# Patient Record
Sex: FEMALE | Race: WHITE | NOT HISPANIC OR LATINO | Employment: OTHER | URBAN - NONMETROPOLITAN AREA
[De-identification: names, ages, dates, MRNs, and addresses within clinical notes are randomized per-mention and may not be internally consistent; named-entity substitution may affect disease eponyms.]

---

## 2017-07-06 ENCOUNTER — HOSPITAL ENCOUNTER (OUTPATIENT)
Dept: RADIOLOGY | Facility: OTHER | Age: 57
End: 2017-07-06
Attending: NURSE PRACTITIONER

## 2017-07-06 ENCOUNTER — HISTORY (OUTPATIENT)
Dept: FAMILY MEDICINE | Facility: OTHER | Age: 57
End: 2017-07-06

## 2017-07-06 ENCOUNTER — OFFICE VISIT - GICH (OUTPATIENT)
Dept: FAMILY MEDICINE | Facility: OTHER | Age: 57
End: 2017-07-06

## 2017-07-06 DIAGNOSIS — M25.562 PAIN IN LEFT KNEE: ICD-10-CM

## 2017-12-21 ENCOUNTER — COMMUNICATION - GICH (OUTPATIENT)
Dept: FAMILY MEDICINE | Facility: OTHER | Age: 57
End: 2017-12-21

## 2017-12-21 DIAGNOSIS — F32.89 OTHER SPECIFIED DEPRESSIVE EPISODES: ICD-10-CM

## 2017-12-27 NOTE — PROGRESS NOTES
Patient Information     Patient Name MRN Sex     Josie Wilson [id number] Female 1960      Progress Notes by Lakisha De La Cruz NP at 2017  9:15 AM     Author:  Lakisha De La Cruz NP Service:  (none) Author Type:  PHYS- Nurse Practitioner     Filed:  2017  1:11 PM Encounter Date:  2017 Status:  Signed     :  Lakisha De La Cruz NP (PHYS- Nurse Practitioner)            HPI:    Josie Wilson is a 57 y.o. female who presents to clinic today for leg pain. Has had pain present for 1 week. No known injuries to leg/knee. She was working on a roof replacing shingles last week. Also going up/down ladders often. No other changes in activty or exercise. No pain with walking on flat areas. If she twists or turns her knee/leg a certain way will having shooting pain. She has used heat/ice, advil for sx. Hx of torn meniscus that did not have surgical repair and pain resolved, this was about 1 1/2 years ago. Denies any swelling. Pain seems to be to inner mid-shin area. No feeling of knee giving out on her. Had pain going up stairs until yesterday, this seems to be better. She does have pain going up ramp. No issues going down stairs or ramp.     Past Medical History:     Diagnosis  Date     ANEMIA, SEVERE      Depression     History of depression       Hx of being hospitalized age 14    for mono      MENORRHAGIA      Past Surgical History:      Procedure  Laterality Date     AMPUTATION  3/6/12     revision of left index finger, Dr Cortez       COLONOSCOPY SCREENING  2014    10 year follow up       LAP CHOLECYSTECTOMY       TOTAL ABDOMINAL HYSTERECTOMY       Social History     Substance Use Topics       Smoking status: Never Smoker     Smokeless tobacco: Not on file     Alcohol use No     Current Outpatient Prescriptions       Medication  Sig Dispense Refill     multivitamin (MVI) tablet Take 1 tablet by mouth once daily.  0     PARoxetine (PAXIL) 40 mg tablet Take 1 tablet by mouth once daily. 90  tablet 3     No current facility-administered medications for this visit.      Medications have been reviewed by me and are current to the best of my knowledge and ability.    Allergies     Allergen  Reactions     Amoxicillin Rash       ROS:  Pertinent positives and negatives are noted in HPI.    EXAM:  General appearance: well appearing female, in no acute distress  Musculoskeletal: normal gait, tender with palpation over lateral knee to lateral upper shin  Dermatological: no rashes or lesions  Psychological: normal affect, alert and pleasant  Xray: xray independently reviewed and no acute findings on xray other than some arthritis appreciated; pending radiology over-read      ASSESSMENT/PLAN:    ICD-10-CM    1. Acute pain of left knee M25.562 XR KNEE 3 VIEWS AP LAT SUNRISE LEFT   Xray shows some arthritis. Sx consistent with exacerbation of arthritis vs overuse injury. Recommend sx management and reviewed s/s that would warrant f/u. All questions were answered and she is in agreement with plan.     Patient Instructions   Xray shows some arthritis  I will call with radiology report if any other concerns  Continue with heat or ice  May use ibuprofen or tylenol for pain  Avoid kneeling, ladders or any activity that may aggravate the knee pain  If symptoms last more than 1-2 weeks please follow up

## 2017-12-28 NOTE — PATIENT INSTRUCTIONS
Patient Information     Patient Name MRN Josie Reyna [id number] Female 1960      Patient Instructions by Lakisha De La Cruz NP at 2017  9:15 AM     Author:  Lakisha De La Cruz NP Service:  (none) Author Type:  PHYS- Nurse Practitioner     Filed:  2017  9:46 AM Encounter Date:  2017 Status:  Signed     :  Lakisha De La Cruz NP (PHYS- Nurse Practitioner)            Xray shows some arthritis  I will call with radiology report if any other concerns  Continue with heat or ice  May use ibuprofen or tylenol for pain  Avoid kneeling, ladders or any activity that may aggravate the knee pain  If symptoms last more than 1-2 weeks please follow up

## 2017-12-30 NOTE — NURSING NOTE
Patient Information     Patient Name MRN Josie Reyna [id number] Female 1960      Nursing Note by Carrol Villalobos at 2017  9:15 AM     Author:  Carorl Villalobos Service:  (none) Author Type:  (none)     Filed:  2017  9:14 AM Encounter Date:  2017 Status:  Signed     :  Crarol Villalobos            Patient presents to clinic with left leg pain. Patient states that it doesn't affect gait but when she moves a certain way, a sharp shooting pain happens from the knee down the leg. X 1 week.  Carrol VillalobosLPN ....................  2017   9:10 AM

## 2018-01-27 VITALS
BODY MASS INDEX: 28.77 KG/M2 | WEIGHT: 162.4 LBS | DIASTOLIC BLOOD PRESSURE: 68 MMHG | RESPIRATION RATE: 18 BRPM | HEIGHT: 63 IN | HEART RATE: 68 BPM | SYSTOLIC BLOOD PRESSURE: 128 MMHG | TEMPERATURE: 97.7 F

## 2018-01-30 ENCOUNTER — DOCUMENTATION ONLY (OUTPATIENT)
Dept: FAMILY MEDICINE | Facility: OTHER | Age: 58
End: 2018-01-30

## 2018-01-30 PROBLEM — N92.0 MENORRHAGIA: Status: ACTIVE | Noted: 2018-01-30

## 2018-01-30 PROBLEM — D64.9 ANEMIA: Status: ACTIVE | Noted: 2018-01-30

## 2018-01-30 RX ORDER — PAROXETINE 40 MG/1
40 TABLET, FILM COATED ORAL DAILY
COMMUNITY
Start: 2017-12-22 | End: 2018-03-26

## 2018-01-30 RX ORDER — DIPHENOXYLATE HYDROCHLORIDE AND ATROPINE SULFATE 2.5; .025 MG/1; MG/1
1 TABLET ORAL DAILY
COMMUNITY
Start: 2013-09-03 | End: 2024-02-26

## 2018-02-12 NOTE — TELEPHONE ENCOUNTER
Patient Information     Patient Name MRN Josie Reyna [id number] Female 1960      Telephone Encounter by Kourtney Mcallister RN at 2017  3:20 PM     Author:  Kourtney Mcallister RN Service:  (none) Author Type:  NURS- Registered Nurse     Filed:  2017  3:38 PM Encounter Date:  2017 Status:  Signed     :  Kourtney Mcallister RN (NURS- Registered Nurse)            Patient was due for annual physical on 2017.    Depression-in adults 18 and over  SSRI    Office visit in the past 12 months or as indicated in chart.  Should have clinic visit 1-2 months after initial prescription.    Last visit with COLLINS MAURICIO was on: 2016 in [facility]  Next visit with COLLINS MAURICIO is on: No future appointment listed with this provider  Next visit with Family Practice is on: No future appointment listed in this department    Max refills 12 months from last office visit or per providers notes.    PHQ Depression Screening 2016   Date of PHQ exam (doc flow) 2016   1. Lack of interest/pleasure 1 - Several days 0 - Not at all   2. Feeling down/depressed 1 - Several days 0 - Not at all   PHQ-2 TOTAL SCORE 2 0   3. Trouble sleeping 1 - Several days 1 - Several days   4. Decreased energy 2 - More than half the days 1 - Several days   5. Appetite change 1 - Several days 0 - Not at all   6. Feelings of failure 0 - Not at all 0 - Not at all   7. Trouble concentrating 0 - Not at all 0 - Not at all   8. Activity level 0 - Not at all 1 - Several days   9. Hurting yourself 0 - Not at all 0 - Not at all   PHQ-9 TOTAL SCORE 6 3   PHQ-9 Severity Level mild none   Functional Impairment somewhat difficult -   Some recent data might be hidden       Patient is due for medication management appointment. Limited refill provided at this time. [employer] message and/or letter sent for reminder to patient. Prescription refilled per RN Medication Refill  Policy.................... Kourtney Mcallister RN ....................  12/22/2017   3:37 PM

## 2018-03-21 ENCOUNTER — TELEPHONE (OUTPATIENT)
Dept: FAMILY MEDICINE | Facility: OTHER | Age: 58
End: 2018-03-21

## 2018-03-21 NOTE — TELEPHONE ENCOUNTER
Please call patient, they are inquiring on whether they need to schedule an office visit for medication renewal, or, if Dr. Newell could just renew.

## 2018-03-22 NOTE — TELEPHONE ENCOUNTER
Spoke with patient who confirmed her last name and birth date. Informed patient that she needed to be seen for medication refill. Patient will be out of medication soon, scheduled for Monday.  Anastasia Villalba LPN 3/22/2018 8:28 AM

## 2018-03-22 NOTE — TELEPHONE ENCOUNTER
Writer will contact patient and read her message from 12/21/17 when her medication was refilled last, and inform patient that she needs to be seen. Message pasted in is from old system.     Anastasia Villalba HAYDEE 3/22/2018 8:17 AM              Dear MsJesusita Wilson:     This letter is to remind you that you are due for your annual exam with Luis Newell MD. Your last comprehensive visit was more than 12 months ago.     A LIMITED refill of         PARoxetine (PAXIL) 40 mg tablet     has been called into your pharmacy. Additional refills require you to complete this appointment.     Please call the clinic at [phone] to schedule your appointment.     If you should require additional refills before your scheduled appointment, please contact your pharmacy and we will refill your medication until the date of your appointment.     If you are no longer seeing Luis Newell MD for primary care, please call to let us know. Doing so will remove you from our call/contact list.       Thank you for choosing [facility] for your health care needs.     Sincerely,     Refill RN   [facility]

## 2018-03-26 ENCOUNTER — OFFICE VISIT (OUTPATIENT)
Dept: FAMILY MEDICINE | Facility: OTHER | Age: 58
End: 2018-03-26
Attending: FAMILY MEDICINE
Payer: COMMERCIAL

## 2018-03-26 ENCOUNTER — HEALTH MAINTENANCE LETTER (OUTPATIENT)
Age: 58
End: 2018-03-26

## 2018-03-26 VITALS
WEIGHT: 165.2 LBS | HEIGHT: 63 IN | DIASTOLIC BLOOD PRESSURE: 78 MMHG | BODY MASS INDEX: 29.27 KG/M2 | SYSTOLIC BLOOD PRESSURE: 112 MMHG

## 2018-03-26 DIAGNOSIS — F33.42 RECURRENT MAJOR DEPRESSIVE DISORDER, IN FULL REMISSION (H): Primary | ICD-10-CM

## 2018-03-26 DIAGNOSIS — Z23 NEED FOR DIPHTHERIA-TETANUS-PERTUSSIS (TDAP) VACCINE, ADULT/ADOLESCENT: ICD-10-CM

## 2018-03-26 DIAGNOSIS — D50.0 IRON DEFICIENCY ANEMIA DUE TO CHRONIC BLOOD LOSS: ICD-10-CM

## 2018-03-26 DIAGNOSIS — E78.5 HYPERLIPIDEMIA LDL GOAL <100: ICD-10-CM

## 2018-03-26 PROBLEM — N92.0 MENORRHAGIA: Status: RESOLVED | Noted: 2018-01-30 | Resolved: 2018-03-26

## 2018-03-26 LAB
CHOLEST SERPL-MCNC: 224 MG/DL
ERYTHROCYTE [DISTWIDTH] IN BLOOD BY AUTOMATED COUNT: 12.4 % (ref 10–15)
HCT VFR BLD AUTO: 42.2 % (ref 35–47)
HDLC SERPL-MCNC: 48 MG/DL (ref 23–92)
HGB BLD-MCNC: 14.2 G/DL (ref 11.7–15.7)
LDLC SERPL CALC-MCNC: 146 MG/DL
MCH RBC QN AUTO: 29.2 PG (ref 26.5–33)
MCHC RBC AUTO-ENTMCNC: 33.6 G/DL (ref 31.5–36.5)
MCV RBC AUTO: 87 FL (ref 78–100)
NONHDLC SERPL-MCNC: 176 MG/DL
PLATELET # BLD AUTO: 317 10E9/L (ref 150–450)
RBC # BLD AUTO: 4.87 10E12/L (ref 3.8–5.2)
TRIGL SERPL-MCNC: 148 MG/DL
WBC # BLD AUTO: 7.4 10E9/L (ref 4–11)

## 2018-03-26 PROCEDURE — G0463 HOSPITAL OUTPT CLINIC VISIT: HCPCS

## 2018-03-26 PROCEDURE — 90471 IMMUNIZATION ADMIN: CPT

## 2018-03-26 PROCEDURE — 99396 PREV VISIT EST AGE 40-64: CPT | Performed by: FAMILY MEDICINE

## 2018-03-26 PROCEDURE — 90715 TDAP VACCINE 7 YRS/> IM: CPT | Performed by: FAMILY MEDICINE

## 2018-03-26 PROCEDURE — 36415 COLL VENOUS BLD VENIPUNCTURE: CPT | Performed by: FAMILY MEDICINE

## 2018-03-26 PROCEDURE — G0463 HOSPITAL OUTPT CLINIC VISIT: HCPCS | Mod: 25

## 2018-03-26 PROCEDURE — 85027 COMPLETE CBC AUTOMATED: CPT | Performed by: FAMILY MEDICINE

## 2018-03-26 PROCEDURE — 80061 LIPID PANEL: CPT | Performed by: FAMILY MEDICINE

## 2018-03-26 RX ORDER — PAROXETINE 40 MG/1
40 TABLET, FILM COATED ORAL DAILY
Qty: 90 TABLET | Refills: 3 | Status: SHIPPED | OUTPATIENT
Start: 2018-03-26 | End: 2019-04-04

## 2018-03-26 ASSESSMENT — PAIN SCALES - GENERAL: PAINLEVEL: NO PAIN (0)

## 2018-03-26 NOTE — LETTER
March 26, 2018      Josie Wilson  [address] [address] [address]        Dear ,    We are writing to inform you of your test results.    Your hgb and wbc look good but as expected your lipids are higher than we like; I suggest reducing your red meat consumption.  COLLINS MAURICIO MD on 3/26/2018 at 3:11 PM     Resulted Orders   Lipid Profile   Result Value Ref Range    Cholesterol 224 (H) <200 mg/dL    Triglycerides 148 <150 mg/dL    HDL Cholesterol 48 23 - 92 mg/dL    LDL Cholesterol Calculated 146 (H) <100 mg/dL      Comment:      Above desirable:  100-129 mg/dl  Borderline High:  130-159 mg/dL  High:             160-189 mg/dL  Very high:       >189 mg/dl      Non HDL Cholesterol 176 (H) <130 mg/dL      Comment:      Above Desirable:  130-159 mg/dl  Borderline high:  160-189 mg/dl  High:             190-219 mg/dl  Very high:       >219 mg/dl     CBC with platelets   Result Value Ref Range    WBC 7.4 4.0 - 11.0 10e9/L    RBC Count 4.87 3.8 - 5.2 10e12/L    Hemoglobin 14.2 11.7 - 15.7 g/dL    Hematocrit 42.2 35.0 - 47.0 %    MCV 87 78 - 100 fl    MCH 29.2 26.5 - 33.0 pg    MCHC 33.6 31.5 - 36.5 g/dL    RDW 12.4 10.0 - 15.0 %    Platelet Count 317 150 - 450 10e9/L       If you have any questions or concerns, please call the clinic at the number listed above.       Sincerely,        COLLINS MAURICIO MD

## 2018-03-26 NOTE — PROGRESS NOTES
"  SUBJECTIVE:   Josie Wilson is a 57 year old female who presents to clinic today for the following health issues:  Comes in for health care maintenance anmd has been doing well; she has had depressive sx which are much better on the paroxetine. Her lipids have been mildly up so this can be checked and she was anemic prior to hysterectomy so will recheck this. Had hysterectomy 10 + years ago. Is due for DT so will give today. Shingrex was offered but declined.            Problem list and histories reviewed & adjusted, as indicated.  Additional history: as documented        Reviewed and updated as needed this visit by clinical staff  Tobacco       Reviewed and updated as needed this visit by Provider         ROS:  CONSTITUTIONAL: NEGATIVE for fever, chills, change in weight  INTEGUMENTARY/SKIN: NEGATIVE for worrisome rashes, moles or lesions  EYES: NEGATIVE for vision changes or irritation  ENT/MOUTH: NEGATIVE for ear, mouth and throat problems  RESP: NEGATIVE for significant cough or SOB  BREAST: NEGATIVE for masses, tenderness or discharge  CV: NEGATIVE for chest pain, palpitations or peripheral edema  GI: NEGATIVE for nausea, abdominal pain, heartburn, or change in bowel habits  : NEGATIVE for frequency, dysuria, or hematuria  MUSCULOSKELETAL: NEGATIVE for significant arthralgias or myalgia  NEURO: NEGATIVE for weakness, dizziness or paresthesias  ENDOCRINE: NEGATIVE for temperature intolerance, skin/hair changes  HEME: NEGATIVE for bleeding problems  PSYCHIATRIC: NEGATIVE for changes in mood or affect    OBJECTIVE:     /78 (BP Location: Right arm, Patient Position: Sitting, Cuff Size: Adult Regular)  Ht 5' 3\" (1.6 m)  Wt 165 lb 3.2 oz (74.9 kg)  Breastfeeding? No  BMI 29.26 kg/m2  Body mass index is 29.26 kg/(m^2).  GENERAL: healthy, alert and no distress  NECK: no adenopathy, no asymmetry, masses, or scars and thyroid normal to palpation  RESP: lungs clear to auscultation - no rales, rhonchi or " wheezes  CV: regular rate and rhythm, normal S1 S2, no S3 or S4, no murmur, click or rub, no peripheral edema and peripheral pulses strong  ABDOMEN: soft, nontender, no hepatosplenomegaly, no masses and bowel sounds normal  MS: no gross musculoskeletal defects noted, no edema        ASSESSMENT/PLAN:           1. Recurrent major depressive disorder, in full remission (H)    - PARoxetine (PAXIL) 40 MG tablet; Take 1 tablet (40 mg) by mouth daily  Dispense: 90 tablet; Refill: 3    2. Iron deficiency anemia due to chronic blood loss    - CBC with platelets    3. Hyperlipidemia LDL goal <100    - Lipid Profile    4. Need for diphtheria-tetanus-pertussis (Tdap) vaccine, adult/adolescent    - TDAP VACCINE (BOOSTRIX )    See Patient Instructions    COLLINS MAURICIO MD  [facility]

## 2018-03-26 NOTE — MR AVS SNAPSHOT
After Visit Summary   3/26/2018    Josie Wilson    MRN: [id number]           Patient Information     Date Of Birth          1960        Visit Information        Provider Department      3/26/2018 8:15 AM Luis Newell MD [facility]        Today's Diagnoses     Recurrent major depressive disorder, in full remission (H)    -  1    Iron deficiency anemia due to chronic blood loss        Hyperlipidemia LDL goal <100        Need for diphtheria-tetanus-pertussis (Tdap) vaccine, adult/adolescent           Follow-ups after your visit        Your next 10 appointments already scheduled     Mar 29, 2018 10:00 AM CDT   (Arrive by 9:45 AM)   MA SCREENING DIGITAL BILATERAL with GHMA1   [facility] ([facility])    [address]  [place] [address] [phone]           Do not use any powder, lotion or deodorant under your arms or on your breast. If you do, we will ask you to remove it before your exam.  Wear comfortable, two-piece clothing.  If you have any allergies, tell your care team.  Bring any previous mammograms from other facilities or have them mailed to the breast center.              Future tests that were ordered for you today     Open Future Orders        Priority Expected Expires Ordered    *MA Screening Digital Bilateral Routine  3/26/2019 3/26/2018            Who to contact     If you have questions or need follow up information about today's clinic visit or your schedule please contact [facility] directly at [phone].  Normal or non-critical lab and imaging results will be communicated to you by MyChart, letter or phone within 4 business days after the clinic has received the results. If you do not hear from us within 7 days, please contact the clinic through MyChart or phone. If you have a critical or abnormal lab result, we will notify you by phone as soon as possible.  Submit  "refill requests through [employer] or call your pharmacy and they will forward the refill request to us. Please allow 3 business days for your refill to be completed.          Additional Information About Your Visit        [employer] Information     [employer] gives you secure access to your electronic health record. If you see a primary care provider, you can also send messages to your care team and make appointments. If you have questions, please call your primary care clinic.  If you do not have a primary care provider, please call [phone] and they will assist you.        Care EveryWhere ID     This is your Care EveryWhere ID. This could be used by other organizations to access your [facility] medical records  [id number]        Your Vitals Were     Height Breastfeeding? BMI (Body Mass Index)             5' 3\" (1.6 m) No 29.26 kg/m2          Blood Pressure from Last 3 Encounters:   03/26/18 112/78   07/06/17 128/68   12/12/16 118/74    Weight from Last 3 Encounters:   03/26/18 165 lb 3.2 oz (74.9 kg)   07/06/17 162 lb 6.4 oz (73.7 kg)   12/12/16 165 lb (74.8 kg)              We Performed the Following     CBC with platelets     Lipid Profile     TDAP VACCINE (BOOSTRIX )          Where to get your medicines      These medications were sent to [facility] [id number] [facility] - [place], [place] - [address]  [address] [facility] [address]     Phone:  [phone]     PARoxetine 40 MG tablet          Primary Care Provider Office Phone # Fax #    Luis Newell [phone]       [address] [facility] [address]        Equal Access to Services     [facility] ELSY : Hadii sadie Flaherty, clementina lundberg, endy kapoor. So [facility] [phone].    ATENCIÓN: Si habla español, tiene a trejo disposición servicios gratuitos de asistencia lingüística. Llame al [phone].    We comply with applicable federal civil rights laws and " [place] laws. We do not discriminate on the basis of race, color, national origin, age, disability, sex, sexual orientation, or gender identity.            Thank you!     Thank you for choosing [facility]  for your care. Our goal is always to provide you with excellent care. Hearing back from our patients is one way we can continue to improve our services. Please take a few minutes to complete the written survey that you may receive in the mail after your visit with us. Thank you!             Your Updated Medication List - Protect others around you: Learn how to safely use, store and throw away your medicines at www.disposemymeds.org.          This list is accurate as of 3/26/18 11:59 PM.  Always use your most recent med list.                   Brand Name Dispense Instructions for use Diagnosis    MULTI-VITAMINS Tabs      Take 1 tablet by mouth daily        PARoxetine 40 MG tablet    PAXIL    90 tablet    Take 1 tablet (40 mg) by mouth daily    Recurrent major depressive disorder, in full remission (H)

## 2018-03-27 ASSESSMENT — PATIENT HEALTH QUESTIONNAIRE - PHQ9: SUM OF ALL RESPONSES TO PHQ QUESTIONS 1-9: 2

## 2018-04-06 ENCOUNTER — HOSPITAL ENCOUNTER (OUTPATIENT)
Dept: MAMMOGRAPHY | Facility: OTHER | Age: 58
Discharge: HOME | End: 2018-04-06
Attending: FAMILY MEDICINE | Admitting: FAMILY MEDICINE
Payer: COMMERCIAL

## 2018-04-06 DIAGNOSIS — Z12.39 SCREENING BREAST EXAMINATION: ICD-10-CM

## 2018-04-06 PROCEDURE — 77067 SCR MAMMO BI INCL CAD: CPT

## 2018-04-12 ENCOUNTER — HOSPITAL ENCOUNTER (OUTPATIENT)
Dept: MAMMOGRAPHY | Facility: OTHER | Age: 58
End: 2018-04-12
Attending: FAMILY MEDICINE
Payer: COMMERCIAL

## 2018-04-12 ENCOUNTER — HOSPITAL ENCOUNTER (OUTPATIENT)
Dept: ULTRASOUND IMAGING | Facility: OTHER | Age: 58
Discharge: HOME | End: 2018-04-12
Attending: FAMILY MEDICINE | Admitting: FAMILY MEDICINE
Payer: COMMERCIAL

## 2018-04-12 DIAGNOSIS — R92.8 ABNORMAL FINDING ON BREAST IMAGING: ICD-10-CM

## 2018-04-12 PROCEDURE — 77065 DX MAMMO INCL CAD UNI: CPT | Mod: LT

## 2018-04-12 PROCEDURE — 76642 ULTRASOUND BREAST LIMITED: CPT | Mod: LT

## 2018-07-24 NOTE — PROGRESS NOTES
Patient Information     Patient Name  Josie Wilson MRN  [id number] Sex  Female   1960      Letter by Luis Newell MD at      Author:  Luis Newell MD Service:  (none) Author Type:  (none)    Filed:   Encounter Date:  2017 Status:  (Other)           Josie Wilson  [address]  [facility] [address]          2017    Dear Ms. Wilson:    This letter is to remind you that you are due for your annual exam with Luis Newell MD. Your last comprehensive visit was more than 12 months ago.    A LIMITED refill of         PARoxetine (PAXIL) 40 mg tablet     has been called into your pharmacy. Additional refills require you to complete this appointment.    Please call the clinic at [phone] to schedule your appointment.    If you should require additional refills before your scheduled appointment, please contact your pharmacy and we will refill your medication until the date of your appointment.    If you are no longer seeing Luis Newell MD for primary care, please call to let us know. Doing so will remove you from our call/contact list.      Thank you for choosing [facility] and [facility] for your health care needs.    Sincerely,    Refill RN  [facility]

## 2019-04-04 DIAGNOSIS — F33.42 RECURRENT MAJOR DEPRESSIVE DISORDER, IN FULL REMISSION (H): ICD-10-CM

## 2019-04-04 RX ORDER — PAROXETINE 40 MG/1
40 TABLET, FILM COATED ORAL DAILY
Qty: 90 TABLET | Refills: 0 | Status: SHIPPED | OUTPATIENT
Start: 2019-04-04 | End: 2019-07-12

## 2019-07-12 ENCOUNTER — OFFICE VISIT (OUTPATIENT)
Dept: FAMILY MEDICINE | Facility: OTHER | Age: 59
End: 2019-07-12
Attending: NURSE PRACTITIONER
Payer: COMMERCIAL

## 2019-07-12 VITALS
HEIGHT: 63 IN | HEART RATE: 72 BPM | DIASTOLIC BLOOD PRESSURE: 80 MMHG | RESPIRATION RATE: 12 BRPM | SYSTOLIC BLOOD PRESSURE: 118 MMHG | WEIGHT: 161 LBS | TEMPERATURE: 98 F | BODY MASS INDEX: 28.53 KG/M2

## 2019-07-12 DIAGNOSIS — F33.42 RECURRENT MAJOR DEPRESSIVE DISORDER, IN FULL REMISSION (H): ICD-10-CM

## 2019-07-12 PROCEDURE — 99214 OFFICE O/P EST MOD 30 MIN: CPT | Performed by: NURSE PRACTITIONER

## 2019-07-12 PROCEDURE — G0463 HOSPITAL OUTPT CLINIC VISIT: HCPCS

## 2019-07-12 RX ORDER — PAROXETINE 40 MG/1
40 TABLET, FILM COATED ORAL DAILY
Qty: 90 TABLET | Refills: 3 | Status: SHIPPED | OUTPATIENT
Start: 2019-07-12 | End: 2019-12-31

## 2019-07-12 ASSESSMENT — PAIN SCALES - GENERAL: PAINLEVEL: NO PAIN (1)

## 2019-07-12 ASSESSMENT — MIFFLIN-ST. JEOR: SCORE: 1256.48

## 2019-07-12 ASSESSMENT — PATIENT HEALTH QUESTIONNAIRE - PHQ9: SUM OF ALL RESPONSES TO PHQ QUESTIONS 1-9: 3

## 2019-07-12 NOTE — PROGRESS NOTES
HPI:    Josie Wilson is a 59 year old female who presents to clinic today for med management.  She has been on fluoxetine 40 mg for 8 to 10 years to manage her major depression.  She reports that this is working very well for her.  She does not have any side effects of the medication.  She does have difficulty sleeping at night and takes melatonin 10 mg which is working well for her.  She denies any concerns regarding suicidal ideation or self-harm.  She is due for her physical and will get this scheduled.    Past Medical History:   Diagnosis Date     Anemia     No Comments Provided     Excessive and frequent menstruation with regular cycle     No Comments Provided     Major depressive disorder, single episode     History of depression     Personal history of other medical treatment (CODE)     age 14,for mono       Past Surgical History:   Procedure Laterality Date     COLONOSCOPY      2014,10 year follow up     HYSTERECTOMY TOTAL ABDOMINAL           LAPAROSCOPIC CHOLECYSTECTOMY           OTHER SURGICAL HISTORY      3/6/12,[id number],AMPUTATION,revision of left index finger, Dr Cortez       Family History   Problem Relation Age of Onset     Substance Abuse Mother         Alcohol/Drug,Problems with alcohol abuse     Heart Disease Father         Heart Disease,CAD, MI, did smoke     Diabetes Father         Diabetes     Hypertension Father         Hypertension     Hypertension Sister         Hypertension     Hypertension Sister         Hypertension     Breast Cancer Maternal Aunt         Cancer-breast     Cancer Maternal Aunt         Cancer, of pharyngeal cancer age 68     Other - See Comments Other         Psychiatric illness,Depression, probably committed suicide     Heart Disease Other         Heart Disease,MI     Other - See Comments Other         smoked     Diabetes Other         Diabetes     Heart Disease Other         Heart Disease,CAD     Heart Disease Other         Heart Disease,CAD      Cancer Other         Cancer,Testicular cancer.       Social History     Socioeconomic History     Marital status:      Spouse name: Not on file     Number of children: Not on file     Years of education: Not on file     Highest education level: Not on file   Occupational History     Not on file   Social Needs     Financial resource strain: Not on file     Food insecurity:     Worry: Not on file     Inability: Not on file     Transportation needs:     Medical: Not on file     Non-medical: Not on file   Tobacco Use     Smoking status: Never Smoker     Smokeless tobacco: Never Used   Substance and Sexual Activity     Alcohol use: No     Drug use: Never     Comment: Drug use: No     Sexual activity: Not Currently   Lifestyle     Physical activity:     Days per week: Not on file     Minutes per session: Not on file     Stress: Not on file   Relationships     Social connections:     Talks on phone: Not on file     Gets together: Not on file     Attends [employer] service: Not on file     Active member of club or organization: Not on file     Attends meetings of clubs or organizations: Not on file     Relationship status: Not on file     Intimate partner violence:     Fear of current or ex partner: Not on file     Emotionally abused: Not on file     Physically abused: Not on file     Forced sexual activity: Not on file   Other Topics Concern     Not on file   Social History Narrative    Lives with her .  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  One of their sons works with them and another one works part time when he is home from college.  Another son is working construction in [place] and 2 daughters still at home, one in college at [employer].    p 9/3/2013.       Current Outpatient Medications   Medication Sig Dispense Refill     PARoxetine (PAXIL) 40 MG tablet Take 1 tablet (40 mg) by mouth daily 90 tablet 3     Multiple Vitamin (MULTI-VITAMINS) TABS Take 1 tablet by mouth daily   "       Allergies   Allergen Reactions     Amoxicillin Rash       ROS:  Pertinent positives and negatives are noted in HPI.    EXAM:  /80 (BP Location: Right arm, Patient Position: Sitting, Cuff Size: Adult Regular)   Pulse 72   Temp 98  F (36.7  C) (Tympanic)   Resp 12   Ht 1.588 m (5' 2.5\")   Wt 73 kg (161 lb)   BMI 28.98 kg/m    General appearance: well appearing female, in no acute distress  Respiratory: clear to auscultation bilaterally  Cardiac: RRR with no murmurs  She is currently tolerating the proximal a teen psychological: normal affect, alert and pleasant    PHQ Depression Screen  PHQ-9 SCORE 12/12/2016 3/26/2018 7/12/2019   PHQ-9 Total Score 3 2 3     No flowsheet data found.        ASSESSMENT AND PLAN:    1. Recurrent major depressive disorder, in full remission (H)      She is currently tolerating the paroxetine very well.  This is managing her depression symptoms without side effects.  Refill was completed for 1 year.  She will follow-up at her convenience to schedule her annual physical.    Lakisha De La Cruz..................7/12/2019 9:03 AM      This document was prepared using voice generated software.  While every attempt was made for accuracy, grammatical errors may exist.  "

## 2019-07-12 NOTE — LETTER
My Depression Action Plan  Name: Josie Wilson   Date of Birth 1960  Date: 7/12/2019    My doctor: No Ref-Primary, Physician   My clinic: [facility]  [address]  [place] [id number] [phone]          GREEN    ZONE   Good Control    What it looks like:     Things are going generally well. You have normal up s and down s. You may even feel depressed from time to time, but bad moods usually last less than a day.   What you need to do:  1. Continue to care for yourself (see self care plan)  2. Check your depression survival kit and update it as needed  3. Follow your physician s recommendations including any medication.  4. Do not stop taking medication unless you consult with your physician first.           YELLOW         ZONE Getting Worse    What it looks like:     Depression is starting to interfere with your life.     It may be hard to get out of bed; you may be starting to isolate yourself from others.    Symptoms of depression are starting to last most all day and this has happened for several days.     You may have suicidal thoughts but they are not constant.   What you need to do:     1. Call your care team, your response to treatment will improve if you keep your care team informed of your progress. Yellow periods are signs an adjustment may need to be made.     2. Continue your self-care, even if you have to fake it!    3. Talk to someone in your support network    4. Open up your depression survival kit           RED    ZONE Medical Alert - Get Help    What it looks like:     Depression is seriously interfering with your life.     You may experience these or other symptoms: You can t get out of bed most days, can t work or engage in other necessary activities, you have trouble taking care of basic hygiene, or basic responsibilities, thoughts of suicide or death that will not go away, self-injurious behavior.     What you need to do:  1. Call  your care team and request a same-day appointment. If they are not available (weekends or after hours) call your local crisis line, emergency room or 911.            Depression Self Care Plan / Survival Kit    Self-Care for Depression  Here s the deal. Your body and mind are really not as separate as most people think.  What you do and think affects how you feel and how you feel influences what you do and think. This means if you do things that people who feel good do, it will help you feel better.  Sometimes this is all it takes.  There is also a place for medication and therapy depending on how severe your depression is, so be sure to consult with your medical provider and/ or Behavioral Health Consultant if your symptoms are worsening or not improving.     In order to better manage my stress, I will:    Exercise  Get some form of exercise, every day. This will help reduce pain and release endorphins, the  feel good  chemicals in your brain. This is almost as good as taking antidepressants!  This is not the same as joining a gym and then never going! (they count on that by the way ) It can be as simple as just going for a walk or doing some gardening, anything that will get you moving.      Hygiene   Maintain good hygiene (Get out of bed in the morning, Make your bed, Brush your teeth, Take a shower, and Get dressed like you were going to work, even if you are unemployed).  If your clothes don't fit try to get ones that do.    Diet  I will strive to eat foods that are good for me, drink plenty of water, and avoid excessive sugar, caffeine, alcohol, and other mood-altering substances.  Some foods that are helpful in depression are: complex carbohydrates, B vitamins, flaxseed, fish or fish oil, fresh fruits and vegetables.    Psychotherapy  I agree to participate in Individual Therapy (if recommended).    Medication  If prescribed medications, I agree to take them.  Missing doses can result in serious side effects.   I understand that drinking alcohol, or other illicit drug use, may cause potential side effects.  I will not stop my medication abruptly without first discussing it with my provider.    Staying Connected With Others  I will stay in touch with my friends, family members, and my primary care provider/team.    Use your imagination  Be creative.  We all have a creative side; it doesn t matter if it s oil painting, sand castles, or mud pies! This will also kick up the endorphins.    Witness Beauty  (AKA stop and smell the roses) Take a look outside, even in mid-winter. Notice colors, textures. Watch the squirrels and birds.     Service to others  Be of service to others.  There is always someone else in need.  By helping others we can  get out of ourselves  and remember the really important things.  This also provides opportunities for practicing all the other parts of the program.    Humor  Laugh and be silly!  Adjust your TV habits for less news and crime-drama and more comedy.    Control your stress  Try breathing deep, massage therapy, biofeedback, and meditation. Find time to relax each day.     My support system    Clinic Contact:  Phone number:    Contact 1:  Phone number:    Contact 2:  Phone number:    [employer]ist/:  Phone number:    Therapist:  Phone number:    [facility] crisis center:    Phone number:    Other community support:  Phone number:

## 2019-07-12 NOTE — NURSING NOTE
Patient here for medication refills.  Liberty Wu............................... 7/12/2019 8:59 AM    Medication Reconciliation: complete    Liberty Moreno LPN

## 2019-12-31 ENCOUNTER — HOSPITAL ENCOUNTER (OUTPATIENT)
Dept: GENERAL RADIOLOGY | Facility: OTHER | Age: 59
Discharge: HOME | End: 2019-12-31
Attending: NURSE PRACTITIONER | Admitting: NURSE PRACTITIONER
Payer: COMMERCIAL

## 2019-12-31 ENCOUNTER — OFFICE VISIT (OUTPATIENT)
Dept: FAMILY MEDICINE | Facility: OTHER | Age: 59
End: 2019-12-31
Attending: NURSE PRACTITIONER
Payer: COMMERCIAL

## 2019-12-31 ENCOUNTER — HOSPITAL ENCOUNTER (OUTPATIENT)
Dept: GENERAL RADIOLOGY | Facility: OTHER | Age: 59
End: 2019-12-31
Attending: NURSE PRACTITIONER
Payer: COMMERCIAL

## 2019-12-31 VITALS
RESPIRATION RATE: 16 BRPM | WEIGHT: 160.38 LBS | OXYGEN SATURATION: 98 % | SYSTOLIC BLOOD PRESSURE: 118 MMHG | BODY MASS INDEX: 28.42 KG/M2 | DIASTOLIC BLOOD PRESSURE: 72 MMHG | TEMPERATURE: 97.8 F | HEIGHT: 63 IN | HEART RATE: 77 BPM

## 2019-12-31 DIAGNOSIS — M79.642 PAIN OF LEFT HAND: ICD-10-CM

## 2019-12-31 DIAGNOSIS — Z13.1 SCREENING FOR DIABETES MELLITUS: ICD-10-CM

## 2019-12-31 DIAGNOSIS — M25.562 ACUTE PAIN OF LEFT KNEE: ICD-10-CM

## 2019-12-31 DIAGNOSIS — Z13.220 LIPID SCREENING: ICD-10-CM

## 2019-12-31 DIAGNOSIS — F33.42 RECURRENT MAJOR DEPRESSIVE DISORDER, IN FULL REMISSION (H): ICD-10-CM

## 2019-12-31 DIAGNOSIS — D50.9 IRON DEFICIENCY ANEMIA, UNSPECIFIED IRON DEFICIENCY ANEMIA TYPE: ICD-10-CM

## 2019-12-31 DIAGNOSIS — Z23 NEED FOR PROPHYLACTIC VACCINATION AND INOCULATION AGAINST INFLUENZA: ICD-10-CM

## 2019-12-31 DIAGNOSIS — Z00.00 ROUTINE HISTORY AND PHYSICAL EXAMINATION OF ADULT: Primary | ICD-10-CM

## 2019-12-31 LAB
ALBUMIN SERPL-MCNC: 4.5 G/DL (ref 3.5–5.7)
ALP SERPL-CCNC: 68 U/L (ref 34–104)
ALT SERPL W P-5'-P-CCNC: 15 U/L (ref 7–52)
ANION GAP SERPL CALCULATED.3IONS-SCNC: 7 MMOL/L (ref 3–14)
AST SERPL W P-5'-P-CCNC: 16 U/L (ref 13–39)
BASOPHILS # BLD AUTO: 0.1 10E9/L (ref 0–0.2)
BASOPHILS NFR BLD AUTO: 0.7 %
BILIRUB SERPL-MCNC: 0.7 MG/DL (ref 0.3–1)
BUN SERPL-MCNC: 16 MG/DL (ref 7–25)
CALCIUM SERPL-MCNC: 9.5 MG/DL (ref 8.6–10.3)
CHLORIDE SERPL-SCNC: 103 MMOL/L (ref 98–107)
CHOLEST SERPL-MCNC: 205 MG/DL
CO2 SERPL-SCNC: 29 MMOL/L (ref 21–31)
CREAT SERPL-MCNC: 0.78 MG/DL (ref 0.6–1.2)
DIFFERENTIAL METHOD BLD: NORMAL
EOSINOPHIL # BLD AUTO: 0.2 10E9/L (ref 0–0.7)
EOSINOPHIL NFR BLD AUTO: 2 %
ERYTHROCYTE [DISTWIDTH] IN BLOOD BY AUTOMATED COUNT: 12.2 % (ref 10–15)
GFR SERPL CREATININE-BSD FRML MDRD: 75 ML/MIN/{1.73_M2}
GLUCOSE SERPL-MCNC: 103 MG/DL (ref 70–105)
HCT VFR BLD AUTO: 41.5 % (ref 35–47)
HDLC SERPL-MCNC: 48 MG/DL (ref 23–92)
HGB BLD-MCNC: 13.4 G/DL (ref 11.7–15.7)
IMM GRANULOCYTES # BLD: 0 10E9/L (ref 0–0.4)
IMM GRANULOCYTES NFR BLD: 0.1 %
LDLC SERPL CALC-MCNC: 135 MG/DL
LYMPHOCYTES # BLD AUTO: 2.4 10E9/L (ref 0.8–5.3)
LYMPHOCYTES NFR BLD AUTO: 30.2 %
MCH RBC QN AUTO: 28.5 PG (ref 26.5–33)
MCHC RBC AUTO-ENTMCNC: 32.3 G/DL (ref 31.5–36.5)
MCV RBC AUTO: 88 FL (ref 78–100)
MONOCYTES # BLD AUTO: 0.7 10E9/L (ref 0–1.3)
MONOCYTES NFR BLD AUTO: 8.4 %
NEUTROPHILS # BLD AUTO: 4.7 10E9/L (ref 1.6–8.3)
NEUTROPHILS NFR BLD AUTO: 58.6 %
NONHDLC SERPL-MCNC: 157 MG/DL
PLATELET # BLD AUTO: 321 10E9/L (ref 150–450)
POTASSIUM SERPL-SCNC: 3.9 MMOL/L (ref 3.5–5.1)
PROT SERPL-MCNC: 7.2 G/DL (ref 6.4–8.9)
RBC # BLD AUTO: 4.71 10E12/L (ref 3.8–5.2)
SODIUM SERPL-SCNC: 139 MMOL/L (ref 134–144)
TRIGL SERPL-MCNC: 108 MG/DL
WBC # BLD AUTO: 8 10E9/L (ref 4–11)

## 2019-12-31 PROCEDURE — 73130 X-RAY EXAM OF HAND: CPT | Mod: LT

## 2019-12-31 PROCEDURE — 85025 COMPLETE CBC W/AUTO DIFF WBC: CPT | Mod: ZL | Performed by: NURSE PRACTITIONER

## 2019-12-31 PROCEDURE — 73560 X-RAY EXAM OF KNEE 1 OR 2: CPT | Mod: LT

## 2019-12-31 PROCEDURE — 36415 COLL VENOUS BLD VENIPUNCTURE: CPT | Mod: ZL | Performed by: NURSE PRACTITIONER

## 2019-12-31 PROCEDURE — 80053 COMPREHEN METABOLIC PANEL: CPT | Mod: ZL | Performed by: NURSE PRACTITIONER

## 2019-12-31 PROCEDURE — G0463 HOSPITAL OUTPT CLINIC VISIT: HCPCS

## 2019-12-31 PROCEDURE — 80061 LIPID PANEL: CPT | Mod: ZL | Performed by: NURSE PRACTITIONER

## 2019-12-31 PROCEDURE — G0463 HOSPITAL OUTPT CLINIC VISIT: HCPCS | Mod: 25

## 2019-12-31 PROCEDURE — 90686 IIV4 VACC NO PRSV 0.5 ML IM: CPT

## 2019-12-31 PROCEDURE — 90471 IMMUNIZATION ADMIN: CPT | Performed by: NURSE PRACTITIONER

## 2019-12-31 PROCEDURE — 90471 IMMUNIZATION ADMIN: CPT

## 2019-12-31 PROCEDURE — 99396 PREV VISIT EST AGE 40-64: CPT | Performed by: NURSE PRACTITIONER

## 2019-12-31 RX ORDER — PAROXETINE 40 MG/1
40 TABLET, FILM COATED ORAL DAILY
Qty: 90 TABLET | Refills: 3 | Status: SHIPPED | OUTPATIENT
Start: 2019-12-31 | End: 2020-10-30

## 2019-12-31 ASSESSMENT — PATIENT HEALTH QUESTIONNAIRE - PHQ9: SUM OF ALL RESPONSES TO PHQ QUESTIONS 1-9: 0

## 2019-12-31 ASSESSMENT — PAIN SCALES - GENERAL: PAINLEVEL: MILD PAIN (3)

## 2019-12-31 ASSESSMENT — MIFFLIN-ST. JEOR: SCORE: 1253.65

## 2019-12-31 NOTE — PROGRESS NOTES
Nursing Notes:   Roxann Gomez LPN  12/31/2019  8:12 AM  Signed  Patient presents to clinic today for a physical. She states she has concerns about bilateral knee pain and left middle hand joint pain.     No LMP recorded. Patient has had a hysterectomy.  Medication Reconciliation: complete    Roxann Gomez LPN  12/31/2019 8:08 AM      ANNUAL PHYSICAL - FEMALE    HPI: Josie Wilson is a 59 year old female who presents for a yearly exam.      Concerns include: Left hand swelling and left knee pain    She has had swelling to her left third distal metacarpal for a long time.  He does not have significant pain but reports this is achy.  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    Left knee pain that seems to be aggravated over the past 2 weeks.  This seems to be worse at night when she rests.  She has not had any injuries that she is aware of.  No changes in her gait.  She has not taken anything such as ibuprofen or Tylenol, no heat or ice.  Nothing makes this better nothing makes this worse.    No LMP recorded. Patient has had a hysterectomy.   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), 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 03/06/2012     Priority: Medium       Past Medical History:   Diagnosis Date     Anemia     No Comments Provided     Excessive and frequent menstruation with regular cycle     No Comments Provided      Major depressive disorder, single episode     History of depression     Personal history of other medical treatment (CODE)     age 14,for mono       Past Surgical History:   Procedure Laterality Date     COLONOSCOPY  2014,10 year follow up     HYSTERECTOMY TOTAL ABDOMINAL           LAPAROSCOPIC CHOLECYSTECTOMY           OTHER SURGICAL HISTORY      3/6/12,[id number],AMPUTATION,revision of left index finger, Dr Cortez       Family History   Problem Relation Age of Onset     Substance Abuse Mother         Alcohol/Drug,Problems with alcohol abuse     Heart Disease Father         Heart Disease,CAD, MI, did smoke     Diabetes Father         Diabetes     Hypertension Father         Hypertension     Hypertension Sister         Hypertension     Substance Abuse Sister      Bronchitis Sister      Heart Disease Sister      Hypertension Sister         Hypertension     Other - See Comments Other         Psychiatric illness,Depression, probably committed suicide     Breast Cancer Maternal Aunt         Cancer-breast     Cancer Maternal Aunt         Cancer, of pharyngeal cancer age 68     Heart Disease Other         Heart Disease,MI     Other - See Comments Other         smoked     Diabetes Other         Diabetes     Heart Disease Other         Heart Disease,CAD     Heart Disease Other         Heart Disease,CAD     Cancer Other         Cancer,Testicular cancer.     Hypertension Brother      Hypertension Brother      Hypertension Brother        Social History     Socioeconomic History     Marital status:      Spouse name: Not on file     Number of children: Not on file     Years of education: Not on file     Highest education level: Not on file   Occupational History     Not on file   Social Needs     Financial resource strain: Not on file     Food insecurity:     Worry: Not on file     Inability: Not on file     Transportation needs:     Medical: Not on file     Non-medical: Not on file  "  Tobacco Use     Smoking status: Never Smoker     Smokeless tobacco: Never Used   Substance and Sexual Activity     Alcohol use: No     Drug use: Never     Comment: Drug use: No     Sexual activity: Not Currently   Lifestyle     Physical activity:     Days per week: Not on file     Minutes per session: Not on file     Stress: Not on file   Relationships     Social connections:     Talks on phone: Not on file     Gets together: Not on file     Attends Church service: Not on file     Active member of club or organization: Not on file     Attends meetings of clubs or organizations: Not on file     Relationship status: Not on file     Intimate partner violence:     Fear of current or ex partner: Not on file     Emotionally abused: Not on file     Physically abused: Not on file     Forced sexual activity: Not on file   Other Topics Concern     Not on file   Social History Narrative    Lives with her .  ,  is disabled. 3 children, adults       Current Outpatient Medications   Medication Sig Dispense Refill     Multiple Vitamin (MULTI-VITAMINS) TABS Take 1 tablet by mouth daily       PARoxetine (PAXIL) 40 MG tablet Take 1 tablet (40 mg) by mouth daily 90 tablet 3       Allergies   Allergen Reactions     Amoxicillin Rash       REVIEW OF SYSTEMS:  Complete review of systems was obtained.  All pertinent positives and negatives as noted above.  She is exercising about 15 minutes 5 times a week.  Has had vision exam  No recent dental exam.    PHYSICAL EXAM:  /72 (BP Location: Right arm, Patient Position: Sitting, Cuff Size: Adult Regular)   Pulse 77   Temp 97.8  F (36.6  C) (Tympanic)   Resp 16   Ht 1.588 m (5' 2.5\")   Wt 72.7 kg (160 lb 6 oz)   SpO2 98%   Breastfeeding No   BMI 28.87 kg/m    CONSTITUTIONAL:  Alert,cooperative, NAD.  EYES: No scleral icterus.  PERRLA.  Conjunctiva clear.  ENT/MOUTH: External ears and nose normal.  TMs normal.  Moist mucous membranes. Oropharynx clear.  "   ENDO: No thyromegaly or thyroidnodules.  LYMPH:  No cervical or supraclavicular LA.    BREASTS: No skin abnormalities, no erythema.  No discrete masses.  No nipple discharge, no axillary, supra- or infraclavicular LA.   CARDIOVASCULAR: Regular,S1, S2.  No S3 or S4.  No murmur/gallop/rub.  No peripheral edema.  RESPIRATORY: CTA bilaterally, no wheezes, rhonchi or rales.  GI: Bowel sounds wnl.  Soft, nontender, nondistended.  No masses or HSM.  No rebound orguarding.  MSKEL: Swelling over left third distal metacarpal, left distal index finger status post amputation.  No pain to swelling.  No effusion noted to left knee.  Positive crepitus noted.  INTEGUMENTARY:  Warm, dry.  No rash noted on exposed skin.  NEUROLOGIC: Facies symmetric.  Grossly normal movement and tone.  No tremor.  PSYCHIATRIC: Affect normal.  Speech fluent.      PHQ Depression Screen  PHQ-9 SCORE 3/26/2018 7/12/2019 12/31/2019   PHQ-9 Total Score 2 3 0     Xray: xray independently reviewed and degenerative changes in the left knee and left hand appreciated; pending radiology over-read    Labs:  Results for orders placed or performed in visit on 12/31/19   CBC and Differential     Status: None   Result Value Ref Range    WBC 8.0 4.0 - 11.0 10e9/L    RBC Count 4.71 3.8 - 5.2 10e12/L    Hemoglobin 13.4 11.7 - 15.7 g/dL    Hematocrit 41.5 35.0 - 47.0 %    MCV 88 78 - 100 fl    MCH 28.5 26.5 - 33.0 pg    MCHC 32.3 31.5 - 36.5 g/dL    RDW 12.2 10.0 - 15.0 %    Platelet Count 321 150 - 450 10e9/L    Diff Method Automated Method     % Neutrophils 58.6 %    % Lymphocytes 30.2 %    % Monocytes 8.4 %    % Eosinophils 2.0 %    % Basophils 0.7 %    % Immature Granulocytes 0.1 %    Absolute Neutrophil 4.7 1.6 - 8.3 10e9/L    Absolute Lymphocytes 2.4 0.8 - 5.3 10e9/L    Absolute Monocytes 0.7 0.0 - 1.3 10e9/L    Absolute Eosinophils 0.2 0.0 - 0.7 10e9/L    Absolute Basophils 0.1 0.0 - 0.2 10e9/L    Abs Immature Granulocytes 0.0 0 - 0.4 10e9/L   Comprehensive  Metabolic Panel     Status: None   Result Value Ref Range    Sodium 139 134 - 144 mmol/L    Potassium 3.9 3.5 - 5.1 mmol/L    Chloride 103 98 - 107 mmol/L    Carbon Dioxide 29 21 - 31 mmol/L    Anion Gap 7 3 - 14 mmol/L    Glucose 103 70 - 105 mg/dL    Urea Nitrogen 16 7 - 25 mg/dL    Creatinine 0.78 0.60 - 1.20 mg/dL    GFR Estimate 75 >60 mL/min/[1.73_m2]    GFR Estimate If Black >90 >60 mL/min/[1.73_m2]    Calcium 9.5 8.6 - 10.3 mg/dL    Bilirubin Total 0.7 0.3 - 1.0 mg/dL    Albumin 4.5 3.5 - 5.7 g/dL    Protein Total 7.2 6.4 - 8.9 g/dL    Alkaline Phosphatase 68 34 - 104 U/L    ALT 15 7 - 52 U/L    AST 16 13 - 39 U/L   Lipid Panel     Status: Abnormal   Result Value Ref Range    Cholesterol 205 (H) <200 mg/dL    Triglycerides 108 <150 mg/dL    HDL Cholesterol 48 23 - 92 mg/dL    LDL Cholesterol Calculated 135 (H) <100 mg/dL    Non HDL Cholesterol 157 (H) <130 mg/dL       ASSESSMENT AND PLAN:    1. Routine history and physical examination of adult    2. Recurrent major depressive disorder, in full remission (H)    3. Lipid screening    4. Acute pain of left knee    5. Pain of left hand    6. Need for prophylactic vaccination and inoculation against influenza      Influenza vaccine was updated  Paroxetine was refilled x1 year  Recommend 150 minutes of exercise weekly  Labs obtained as noted above.  The 10-year ASCVD risk score (Rogerio WENDI Jr., et al., 2013) is: 3.5%    Values used to calculate the score:      Age: 59 years      Sex: Female      Is Non- : No      Diabetic: No      Tobacco smoker: No      Systolic Blood Pressure: 118 mmHg      Is BP treated: No      HDL Cholesterol: 48 mg/dL      Total Cholesterol: 205 mg/dL    X-rays are consistent with osteoarthritis changes.  Recommend acetaminophen as needed.  If symptoms persist will recommend orthopedic follow-up.  She is going to her insurance changes and will schedule her mammogram once her insurance is sorted out.    Relevant cancer  screening discussed.    Counseled on healthy diet, Calcium and vitamin D intake, and exercise.    ALVERTO Haines CNP ....................  12/31/2019   8:12 AM       This document was prepared using voice generated software.  While every attempt was made for accuracy, grammatical errors may exist.

## 2019-12-31 NOTE — NURSING NOTE
Patient presents to clinic today for a physical. She states she has concerns about bilateral knee pain and left middle hand joint pain.     No LMP recorded. Patient has had a hysterectomy.  Medication Reconciliation: complete    Roxann Gomez LPN  12/31/2019 8:08 AM

## 2019-12-31 NOTE — PATIENT INSTRUCTIONS
"Healthy Strategies  1. Eat at least 3 meals a day and never skip breakfast.  2. Eat more slowly.  3. Decrease portion size.  4. Provide structure by using meal replacement bars or shakes, and/or low calorie frozen meals.  5. For good nutrition incorporate fruit, vegetables, whole grains, lean protein, and low-fat dairy.  6. Remove trigger foods from yourenvironment to avoid impulse eating.  7. Increase physical activity: get a pedometer and aim for 10,000 steps a day or 30-35 minutes of activity 5 days per week.  8. Weigh yourself daily or at least weekly.  9. Keep a record of what you eat and your activity.  10. Establish a support system such as afriend, group or program.    11. Read Martin Nair's \"Eat to Live\". Remember it is important to have a minimum of 1200 calories a day, okay to use olive oil, 40 grams of fiber daily. No more than two servings (the size of your palm) of red meat a week.     Please consider the following general health recommendations:    Schedule a mammogram annually starting at the age of 40 years old unless recommended earlier by your primary care provider. Come for a general exam once yearly.    Eat a quality diet (generally, low in simple sugars, starches, cholesterol and saturated fat.)    Please get 1500 mg of calcium in divided doses with 1500 units vitamin D in your diet daily. Take supplements as needed to obtain full recommended amounts.     Stay physically active. Regular walking or other exercise is one of the best ways to minimize pain of arthritis; maintain independence and mobility; maintain bone strength; maintain conditioning of your heart. Find something you enjoy and a friend to do it with you.    Maintain ideal weight. Your Body mass index is Body mass index is 28.87 kg/m .. Generally a BMI of 20-25 is considered ideal. Overweight is defined as 25-30, obese is 30-35 and markedly obese is greater than 35.    Apply sun block (SPF 25 or greater) on exposed skin anytime you " are out in the sun to prevent skin cancer.     Wear a seatbelt whenever you are in a car.    Consider a bone density study every 2-5 years to see if you are at increased risk for fracture. If you have osteoporosis, medicine to strengthen your bones can significantly reduce your risk of fracture, back pain, and loss of height.    Colonoscopy (an exam of the colon) is recommended every 10 years after the age of 50 to screen for colon cancer. (More often if you are at increased risk.)    Obtain a flu shot every fall.    Receive a pneumonia shot series after the age of 65 (repeat in 5 years if you have other risk factors). This does not prevent all types of pneumonia, but reduces the risk of the worst bacterial causes of pneumonia.    You should have a tetanus booster at least once every 10 years.    A vaccine to reduce your chances of getting shingles is available if you are over 50. Information about the vaccine is available through the clinic or at:  (https://www.cdc.gov/vaccines/vpd/shingles/public/shingrix/index.html)    Check blood sugar annually. Cholesterol annually unless you have had a normal level when last checked within 5 years.     I recommend that you have a general physical exam every year. You should have a pap test every 3 years between the ages of 21 and 30 and every 3-5 years between the ages of 30 and 65 depending on your test unless you have had previous abnormal pap smears, (in these cases the exams and PAP's should be done on a schedule as recommended by your primary care provider). If you have had hysterectomy in the past, your future Pap plan may be different.

## 2019-12-31 NOTE — LETTER
December 31, 2019      Josie Wilson  [id number] [address] [id number]        Dear ,    We are writing to inform you of your test results.    Test results indicate you may require additional follow up, see comment below.  Your cholesterol levels are mildly elevated although they are improved compared to last year.  I would recommend working on daily exercise approximately 30 minutes a day as well as a heart healthy diet such as the Mediterranean diet.  Plan to follow-up in 1 year for retest.    Resulted Orders   CBC and Differential   Result Value Ref Range    WBC 8.0 4.0 - 11.0 10e9/L    RBC Count 4.71 3.8 - 5.2 10e12/L    Hemoglobin 13.4 11.7 - 15.7 g/dL    Hematocrit 41.5 35.0 - 47.0 %    MCV 88 78 - 100 fl    MCH 28.5 26.5 - 33.0 pg    MCHC 32.3 31.5 - 36.5 g/dL    RDW 12.2 10.0 - 15.0 %    Platelet Count 321 150 - 450 10e9/L    Diff Method Automated Method     % Neutrophils 58.6 %    % Lymphocytes 30.2 %    % Monocytes 8.4 %    % Eosinophils 2.0 %    % Basophils 0.7 %    % Immature Granulocytes 0.1 %    Absolute Neutrophil 4.7 1.6 - 8.3 10e9/L    Absolute Lymphocytes 2.4 0.8 - 5.3 10e9/L    Absolute Monocytes 0.7 0.0 - 1.3 10e9/L    Absolute Eosinophils 0.2 0.0 - 0.7 10e9/L    Absolute Basophils 0.1 0.0 - 0.2 10e9/L    Abs Immature Granulocytes 0.0 0 - 0.4 10e9/L   Comprehensive Metabolic Panel   Result Value Ref Range    Sodium 139 134 - 144 mmol/L    Potassium 3.9 3.5 - 5.1 mmol/L    Chloride 103 98 - 107 mmol/L    Carbon Dioxide 29 21 - 31 mmol/L    Anion Gap 7 3 - 14 mmol/L    Glucose 103 70 - 105 mg/dL    Urea Nitrogen 16 7 - 25 mg/dL    Creatinine 0.78 0.60 - 1.20 mg/dL    GFR Estimate 75 >60 mL/min/[1.73_m2]    GFR Estimate If Black >90 >60 mL/min/[1.73_m2]    Calcium 9.5 8.6 - 10.3 mg/dL    Bilirubin Total 0.7 0.3 - 1.0 mg/dL    Albumin 4.5 3.5 - 5.7 g/dL    Protein Total 7.2 6.4 - 8.9 g/dL    Alkaline Phosphatase 68 34 - 104 U/L    ALT 15 7 - 52 U/L    AST 16 13 - 39 U/L    Lipid Panel   Result Value Ref Range    Cholesterol 205 (H) <200 mg/dL    Triglycerides 108 <150 mg/dL    HDL Cholesterol 48 23 - 92 mg/dL    LDL Cholesterol Calculated 135 (H) <100 mg/dL      Comment:      Above desirable:  100-129 mg/dl  Borderline High:  130-159 mg/dL  High:             160-189 mg/dL  Very high:       >189 mg/dl      Non HDL Cholesterol 157 (H) <130 mg/dL      Comment:      Above Desirable:  130-159 mg/dl  Borderline high:  160-189 mg/dl  High:             190-219 mg/dl  Very high:       >219 mg/dl         If you have any questions or concerns, please call the clinic at the number listed above.       Sincerely,        ALVERTO Haines CNP

## 2020-06-25 ENCOUNTER — NURSE TRIAGE (OUTPATIENT)
Dept: FAMILY MEDICINE | Facility: OTHER | Age: 60
End: 2020-06-25

## 2020-06-25 ENCOUNTER — OFFICE VISIT (OUTPATIENT)
Dept: INTERNAL MEDICINE | Facility: OTHER | Age: 60
End: 2020-06-25
Attending: NURSE PRACTITIONER
Payer: COMMERCIAL

## 2020-06-25 VITALS
WEIGHT: 164.8 LBS | OXYGEN SATURATION: 97 % | SYSTOLIC BLOOD PRESSURE: 128 MMHG | DIASTOLIC BLOOD PRESSURE: 80 MMHG | HEIGHT: 62 IN | TEMPERATURE: 98.4 F | BODY MASS INDEX: 30.33 KG/M2 | RESPIRATION RATE: 16 BRPM | HEART RATE: 74 BPM

## 2020-06-25 DIAGNOSIS — R00.2 PALPITATIONS: ICD-10-CM

## 2020-06-25 DIAGNOSIS — I49.3 PVC'S (PREMATURE VENTRICULAR CONTRACTIONS): ICD-10-CM

## 2020-06-25 DIAGNOSIS — R07.9 CHEST PAIN, UNSPECIFIED TYPE: Primary | ICD-10-CM

## 2020-06-25 DIAGNOSIS — Z82.49 FAMILY HISTORY OF ISCHEMIC HEART DISEASE: ICD-10-CM

## 2020-06-25 DIAGNOSIS — R06.02 SOB (SHORTNESS OF BREATH): ICD-10-CM

## 2020-06-25 DIAGNOSIS — D50.9 IRON DEFICIENCY ANEMIA, UNSPECIFIED IRON DEFICIENCY ANEMIA TYPE: ICD-10-CM

## 2020-06-25 DIAGNOSIS — E78.2 MIXED HYPERLIPIDEMIA: ICD-10-CM

## 2020-06-25 LAB
ALBUMIN SERPL-MCNC: 4.4 G/DL (ref 3.5–5.7)
ALP SERPL-CCNC: 69 U/L (ref 34–104)
ALT SERPL W P-5'-P-CCNC: 18 U/L (ref 7–52)
ANION GAP SERPL CALCULATED.3IONS-SCNC: 7 MMOL/L (ref 3–14)
AST SERPL W P-5'-P-CCNC: 18 U/L (ref 13–39)
BASOPHILS # BLD AUTO: 0.1 10E9/L (ref 0–0.2)
BASOPHILS NFR BLD AUTO: 0.7 %
BILIRUB SERPL-MCNC: 0.6 MG/DL (ref 0.3–1)
BUN SERPL-MCNC: 17 MG/DL (ref 7–25)
CALCIUM SERPL-MCNC: 9.5 MG/DL (ref 8.6–10.3)
CHLORIDE SERPL-SCNC: 101 MMOL/L (ref 98–107)
CHOLEST SERPL-MCNC: 215 MG/DL
CO2 SERPL-SCNC: 30 MMOL/L (ref 21–31)
CREAT SERPL-MCNC: 0.89 MG/DL (ref 0.6–1.2)
DIFFERENTIAL METHOD BLD: NORMAL
EOSINOPHIL # BLD AUTO: 0.2 10E9/L (ref 0–0.7)
EOSINOPHIL NFR BLD AUTO: 2.4 %
ERYTHROCYTE [DISTWIDTH] IN BLOOD BY AUTOMATED COUNT: 12.1 % (ref 10–15)
GFR SERPL CREATININE-BSD FRML MDRD: 64 ML/MIN/{1.73_M2}
GLUCOSE SERPL-MCNC: 98 MG/DL (ref 70–105)
HCT VFR BLD AUTO: 41.9 % (ref 35–47)
HDLC SERPL-MCNC: 42 MG/DL (ref 23–92)
HGB BLD-MCNC: 13.6 G/DL (ref 11.7–15.7)
IMM GRANULOCYTES # BLD: 0 10E9/L (ref 0–0.4)
IMM GRANULOCYTES NFR BLD: 0.3 %
LDLC SERPL CALC-MCNC: 138 MG/DL
LYMPHOCYTES # BLD AUTO: 2.7 10E9/L (ref 0.8–5.3)
LYMPHOCYTES NFR BLD AUTO: 30.7 %
MCH RBC QN AUTO: 28.5 PG (ref 26.5–33)
MCHC RBC AUTO-ENTMCNC: 32.5 G/DL (ref 31.5–36.5)
MCV RBC AUTO: 88 FL (ref 78–100)
MONOCYTES # BLD AUTO: 0.7 10E9/L (ref 0–1.3)
MONOCYTES NFR BLD AUTO: 7.6 %
NEUTROPHILS # BLD AUTO: 5.2 10E9/L (ref 1.6–8.3)
NEUTROPHILS NFR BLD AUTO: 58.3 %
NONHDLC SERPL-MCNC: 173 MG/DL
PLATELET # BLD AUTO: 339 10E9/L (ref 150–450)
POTASSIUM SERPL-SCNC: 3.9 MMOL/L (ref 3.5–5.1)
PROT SERPL-MCNC: 7.2 G/DL (ref 6.4–8.9)
RBC # BLD AUTO: 4.78 10E12/L (ref 3.8–5.2)
SODIUM SERPL-SCNC: 138 MMOL/L (ref 134–144)
TRIGL SERPL-MCNC: 174 MG/DL
TROPONIN I SERPL-MCNC: 2.8 PG/ML
TSH SERPL DL<=0.05 MIU/L-ACNC: 1.76 IU/ML (ref 0.34–5.6)
WBC # BLD AUTO: 8.9 10E9/L (ref 4–11)

## 2020-06-25 PROCEDURE — 99214 OFFICE O/P EST MOD 30 MIN: CPT | Performed by: NURSE PRACTITIONER

## 2020-06-25 PROCEDURE — 84443 ASSAY THYROID STIM HORMONE: CPT | Mod: ZL | Performed by: NURSE PRACTITIONER

## 2020-06-25 PROCEDURE — 85025 COMPLETE CBC W/AUTO DIFF WBC: CPT | Mod: ZL | Performed by: NURSE PRACTITIONER

## 2020-06-25 PROCEDURE — 80061 LIPID PANEL: CPT | Mod: ZL | Performed by: NURSE PRACTITIONER

## 2020-06-25 PROCEDURE — 80053 COMPREHEN METABOLIC PANEL: CPT | Mod: ZL | Performed by: NURSE PRACTITIONER

## 2020-06-25 PROCEDURE — 36415 COLL VENOUS BLD VENIPUNCTURE: CPT | Mod: ZL | Performed by: NURSE PRACTITIONER

## 2020-06-25 PROCEDURE — 93000 ELECTROCARDIOGRAM COMPLETE: CPT | Performed by: INTERNAL MEDICINE

## 2020-06-25 PROCEDURE — 84484 ASSAY OF TROPONIN QUANT: CPT | Mod: ZL | Performed by: NURSE PRACTITIONER

## 2020-06-25 RX ORDER — PREDNISONE 20 MG/1
20 TABLET ORAL 2 TIMES DAILY
Qty: 3 TABLET | Refills: 0 | Status: CANCELLED | OUTPATIENT
Start: 2020-06-25 | End: 2020-06-27

## 2020-06-25 RX ORDER — LORATADINE 10 MG/1
10 TABLET ORAL DAILY
COMMUNITY
End: 2022-05-19

## 2020-06-25 RX ORDER — SODIUM PHOSPHATE,MONO-DIBASIC 19G-7G/118
1 ENEMA (ML) RECTAL DAILY
COMMUNITY
End: 2020-12-17

## 2020-06-25 RX ORDER — ATORVASTATIN CALCIUM 10 MG/1
10 TABLET, FILM COATED ORAL DAILY
Qty: 90 TABLET | Refills: 0 | Status: SHIPPED | OUTPATIENT
Start: 2020-06-25 | End: 2020-09-16

## 2020-06-25 RX ORDER — DIPHENHYDRAMINE HCL 25 MG
25 TABLET ORAL EVERY 8 HOURS PRN
Qty: 1 TABLET | Refills: 0 | Status: CANCELLED | COMMUNITY
Start: 2020-06-25

## 2020-06-25 ASSESSMENT — ENCOUNTER SYMPTOMS
FEVER: 0
DIAPHORESIS: 0
DIZZINESS: 0
PALPITATIONS: 1
FATIGUE: 0
LIGHT-HEADEDNESS: 0

## 2020-06-25 ASSESSMENT — PAIN SCALES - GENERAL: PAINLEVEL: NO PAIN (0)

## 2020-06-25 ASSESSMENT — MIFFLIN-ST. JEOR: SCORE: 1264.75

## 2020-06-25 NOTE — NURSING NOTE
Chief Complaint   Patient presents with     Shortness of Breath   Patient presents to the clinic today for shortness of breath. Patient states that she had an episode last night where she was short of breath and had to sit down, episode lasted about 5 to 10 minutes and took about 20 minutes to regain breath.    Medication Reconciliation: completed   Winnie Sandra LPN  6/25/2020 1:41 PM

## 2020-06-25 NOTE — PROGRESS NOTES
"Nursing Notes:   Ambrocio Sandraie, LPN  2020  2:12 PM  Signed  Chief Complaint   Patient presents with     Shortness of Breath   Patient presents to the clinic today for shortness of breath. Patient states that she had an episode last night where she was short of breath and had to sit down, episode lasted about 5 to 10 minutes and took about 20 minutes to regain breath.    Medication Reconciliation: completed   Winnie MartínharrisHAYDEE  2020 1:41 PM      Nursing note reviewed with patient.  Accuracy and completeness verified.    SUBJECTIVE:                                                      Josie Wilson is a 60 year old year old female patient who presents to the clinic today for chest pain, shortness of breath with exertion.    Per patient, yesterday at 7:30 PM, she experienced an episode of chest (center) tightness, like \"someone was squeezing her\" and SOB which made her stop and sit down. This tightness was relieved within 20 minutes, and she was able to catch breath after 20 minutes. She had been outside removing nails from boards, bending over, walking short distance. She also reports she had 2 other mild similar episodes in the past 2 months. She reports more SOB with exertion lately.  Denies: abnormal sweating, injury to chest, radiating pain, dizziness, palpitations, cough, N&V, fever.  She does have family history of cardiac arrest in her father who  at age 53, his brother  6 months later of cardiac arrest, She reports her brother had congestive heart failure.  She has a personal history of anemia and hyperlipidemia.  She is not on statin therapy or aspirin therapy. She does also report some palpitations.  She reports it is like she has to take an extra breath.  Problem List/PMH: Reviewed in EMR, and made relevant updates today.  Medications: Reviewed in EMR, and made relevant updates today.  Allergies: Reviewed in EMR, and made relevant updates today.    Current Code Status:  No " "Order    REVIEW OF SYSTEMS:    Review of Systems   Constitutional: Negative for diaphoresis, fatigue and fever.   Cardiovascular: Positive for chest pain and palpitations.   Neurological: Negative for dizziness and light-headedness.   All other systems reviewed and are negative.    OBJECTIVE:                                                      EXAM:     /80 (BP Location: Right arm, Patient Position: Sitting, Cuff Size: Adult Regular)   Pulse 74   Temp 98.4  F (36.9  C) (Tympanic)   Resp 16   Ht 1.581 m (5' 2.25\")   Wt 74.8 kg (164 lb 12.8 oz)   SpO2 97%   BMI 29.90 kg/m      Current Pain Score: No Pain (0)     Physical Exam  Vitals signs reviewed.   Constitutional:       Appearance: Normal appearance.   HENT:      Head: Normocephalic and atraumatic.      Nose: Nose normal.      Mouth/Throat:      Mouth: Mucous membranes are moist.      Pharynx: Oropharynx is clear.   Eyes:      Extraocular Movements: Extraocular movements intact.      Conjunctiva/sclera: Conjunctivae normal.      Pupils: Pupils are equal, round, and reactive to light.   Neck:      Musculoskeletal: Normal range of motion and neck supple.   Cardiovascular:      Rate and Rhythm: Normal rate. Rhythm irregular.      Comments: Irregular beat  Abdominal:      General: Bowel sounds are normal.      Palpations: Abdomen is soft.   Musculoskeletal: Normal range of motion.   Skin:     General: Skin is warm and dry.   Neurological:      Mental Status: She is alert and oriented to person, place, and time. Mental status is at baseline.   Psychiatric:         Mood and Affect: Mood normal.         Behavior: Behavior normal.         Thought Content: Thought content normal.         Judgment: Judgment normal.     Diagnostics Completed at this Visit:  Results for orders placed or performed in visit on 06/25/20   TSH Reflex GH     Status: None   Result Value Ref Range    TSH Reflex 1.76 0.34 - 5.60 IU/mL   CBC with platelets differential     Status: None "   Result Value Ref Range    WBC 8.9 4.0 - 11.0 10e9/L    RBC Count 4.78 3.8 - 5.2 10e12/L    Hemoglobin 13.6 11.7 - 15.7 g/dL    Hematocrit 41.9 35.0 - 47.0 %    MCV 88 78 - 100 fl    MCH 28.5 26.5 - 33.0 pg    MCHC 32.5 31.5 - 36.5 g/dL    RDW 12.1 10.0 - 15.0 %    Platelet Count 339 150 - 450 10e9/L    Diff Method Automated Method     % Neutrophils 58.3 %    % Lymphocytes 30.7 %    % Monocytes 7.6 %    % Eosinophils 2.4 %    % Basophils 0.7 %    % Immature Granulocytes 0.3 %    Absolute Neutrophil 5.2 1.6 - 8.3 10e9/L    Absolute Lymphocytes 2.7 0.8 - 5.3 10e9/L    Absolute Monocytes 0.7 0.0 - 1.3 10e9/L    Absolute Eosinophils 0.2 0.0 - 0.7 10e9/L    Absolute Basophils 0.1 0.0 - 0.2 10e9/L    Abs Immature Granulocytes 0.0 0 - 0.4 10e9/L   Lipid Profile     Status: Abnormal   Result Value Ref Range    Cholesterol 215 (H) <200 mg/dL    Triglycerides 174 (H) <150 mg/dL    HDL Cholesterol 42 23 - 92 mg/dL    LDL Cholesterol Calculated 138 (H) <100 mg/dL    Non HDL Cholesterol 173 (H) <130 mg/dL   Comprehensive metabolic panel     Status: None   Result Value Ref Range    Sodium 138 134 - 144 mmol/L    Potassium 3.9 3.5 - 5.1 mmol/L    Chloride 101 98 - 107 mmol/L    Carbon Dioxide 30 21 - 31 mmol/L    Anion Gap 7 3 - 14 mmol/L    Glucose 98 70 - 105 mg/dL    Urea Nitrogen 17 7 - 25 mg/dL    Creatinine 0.89 0.60 - 1.20 mg/dL    GFR Estimate 64 >60 mL/min/[1.73_m2]    GFR Estimate If Black 78 >60 mL/min/[1.73_m2]    Calcium 9.5 8.6 - 10.3 mg/dL    Bilirubin Total 0.6 0.3 - 1.0 mg/dL    Albumin 4.4 3.5 - 5.7 g/dL    Protein Total 7.2 6.4 - 8.9 g/dL    Alkaline Phosphatase 69 34 - 104 U/L    ALT 18 7 - 52 U/L    AST 18 13 - 39 U/L   Troponin GH     Status: None   Result Value Ref Range    Troponin 2.8 <34.0 pg/mL   EKG 12-lead, tracing only     Status: None (Preliminary result)   Result Value Ref Range    Interpretation ECG Click View Image link to view waveform and result         ASSESSMENT/PLAN:                                                       Chest pain, unspecified type  - Troponin GH, negative at 2.8 2.8  - EKG 12-lead, tracing only. Indicated sinus rhythm with premature supraventricular complexes  - Comprehensive metabolic panel, unremarkable  - CBC with platelets differential, unremarkable    SOB (shortness of breath)  - CBC with platelets differential, unremarkable  - Troponin GH, negative at 2.8    Family history of ischemic heart disease  Discussed that she should probably make some dietary changes and implement an exercise program  - Troponin GH  - EKG 12-lead, tracing only. Indicated sinus rhythm with premature supraventricular complexes  - Lipid Profile, cholesterol elevated at 215, triglycerides elevated at 174 HDL cholesterol is 42, LDL elevated at 138 and non-HDL elevated at 173    Iron deficiency anemia, unspecified iron deficiency anemia type  - Comprehensive metabolic panel, unremarkable  - CBC with platelets differential, unremarkable    Mixed hyperlipidemia  We will start prophylactic aspirin and statin today.  - Lipid Profile, cholesterol elevated at 215, triglycerides elevated at 174 HDL cholesterol is 42, LDL elevated at 138 and non-HDL elevated at 173  - aspirin (ASA) 81 MG EC tablet  Dispense: 90 tablet; Refill: 3  - atorvastatin (LIPITOR) 10 MG tablet  Dispense: 90 tablet; Refill: 0  -She should eat a heart healthy diet and exercise for at least 30 minutes 3 times a week.  -Also discussed strength training to prevent bone loss.    Palpitations  - TSH Reflex GH, within normal range    Patient verbalizes understanding and is agreeable to plan of care.    We reviewed her screenings that are overdue.  Strongly encouraged to make appointment for full physical and to have her updated screenings completed.      ALVERTO Rosales, AGNP-C  Internal Medicine  [facility]    06/27/2020 10:10 AM    Portions of this note were dictated using speech recognition software. The note has been  proofread but errors in the text may have been overlooked. Please contact me if there are any concerns regarding the accuracy of the dictation.

## 2020-06-25 NOTE — TELEPHONE ENCOUNTER
You  Juan Lujan PA 7 hours ago (9:08 AM)       TATE Martinez. Pt triaged for episode of chest tightness and SOB last night. Pt currently asymptomatic. Pt scheduled to see you today at 1:40 PM. Thank you, Kourtney Mcallister RN .............. 6/25/2020  9:08 AM      Pt seen today in office by Jenny Flor. Writer will close encounter at this time. Kourtney Mcallister RN .............. 6/25/2020  4:09 PM

## 2020-06-25 NOTE — TELEPHONE ENCOUNTER
S-(situation): Pt c/o episode of chest tightness and SOB last night. Asymptomatic at this time.    B-(background): Overweight, Hyperlipidemia, Family hx: Dad  from MI at 53 and siblings have HTN    A-(assessment): Yesterday at 7:30 PM, Pt experienced episode of chest (center) tightness and SOB; tightness relieved w/in 5-10 minutes, able to catch breath after 20 minutes. Had been outside removing nails from boards, bending over, walking short distance. Isolated incident last night w/ exception of 2 mild episodes in the past 2 mos. More SOB w/ exertion lately. Denies: abnormal sweating, injury to chest, radiating pain, dizziness, palpitations, cough, N&V, fever    R-(recommendations): Protocol recommends Pt be seen today in office to R/O unstable angina and for evaluation of SOB w/ exertion. Pt scheduled appointment with Juan Lujan today at 1:40 PM. FYI to Juan for review.    Kourtney Mcallister RN .............. 2020  9:07 AM      ______________________________________________________        Patient called stating she had chest tightness and SOB last night and wondering what she should do. Pt transferred to nurse triage call line and she verified her last name and . Pt triaged:    Additional Information    Negative: Severe difficulty breathing (e.g., struggling for each breath, speaks in single words)    Negative: Passed out (i.e., fainted, collapsed and was not responding)    Negative: Visible sweat on face or sweat dripping down face    Negative: Sounds like a life-threatening emergency to the triager    Negative: Followed an injury to chest    Negative: SEVERE chest pain    Negative: Pain also present in shoulder(s) or arm(s) or jaw    Negative: Cocaine use within last 3 days    Negative: History of prior 'blood clot' in leg or lungs (i.e., deep vein thrombosis, pulmonary embolism)    Negative: Recent illness requiring prolonged bed rest (i.e., immobilization)    Negative: Hip or leg fracture in past  "2 months (e.g, or had cast on leg or ankle)    Negative: Major surgery in the past month    Negative: Recent long-distance travel with prolonged time in car, bus, plane, or train (i.e., within past 2 weeks; 6 or more hours duration)    Negative: Heart beating irregularly or very rapidly    Negative: Dizziness or lightheadedness    Negative: Coughing up blood    Negative: Patient sounds very sick or weak to the triager    Negative: Fever > 100.5 F (38.1 C)    Negative: Intermittent chest pains persist > 3 days    All other patients with chest pain    Patient wants to be seen    Commented on: Chest pain lasting longer than 5 minutes and ANY of the following:* Over 50 years old* Over 30 years old and at least one cardiac risk factor (i.e., high blood pressure, diabetes, high cholesterol, obesity, smoker or strong family history of heart disease)* Pain is crushing, pressure-like, or heavy * Took nitroglycerin and chest pain was not relieved* History of heart disease (i.e., angina, heart attack, bypass surgery, angioplasty, CHF)     Currently asymptomatic.    Commented on: Difficulty breathing     Currently asymptomatic, but does think she is \"breathing differently.\"    Commented on: Chest pain lasting longer than 5 minutes     Currently asymptomatic.    Commented on: Intermittent chest pain and pain has been increasing in severity or frequency     Unclear. 2 mild episodes within the past 2 months, and then again yesterday (much worse than before).    Answer Assessment - Initial Assessment Questions  Patient is currently asymptomatic. The following describes symptoms experienced yesterday evening.    1. LOCATION: \"Where does it hurt?\"    Center of chest.    2. RADIATION: \"Does the pain go anywhere else?\" (e.g., into neck, jaw, arms, back)  Denies    3. ONSET: \"When did the chest pain begin?\" (Minutes, hours or days)   Onset: 7:30 PM, chest tightness relieved within 5-10 minutes, it took about 20 minutes to catch her " "breath    4. PATTERN  Isolated incident last night with exception of 2 mild episodes within the past 2 months. Currently asymptomatic.  5. DURATION: \"How long does it last\"  See above.    6. SEVERITY: \"How bad is the pain?\"  MODERATE (4-7): interferes with normal activities or awakens from sleep    7. CARDIAC RISK FACTORS:  Overweight, high cholesterol  Strong family history- Dad  of heart attack at age 53 and several of her siblings have HTN.  Denies: prior heart attack, diagnosed angina, HTN, DM, smoking, anxiety    8. PULMONARY RISK FACTORS: \"Do you have any history of lung disease?\"  (e.g., blood clots in lung, asthma, emphysema, birth control pills)  Denies.    9. CAUSE: \"What do you think is causing the chest pain?\"  Unsure. Pt states she had been outside cleaning nails out of boards, bending over, picking them up and walking a short distance prior to symptoms.    10. OTHER SYMPTOMS: \"Do you have any other symptoms?\" (e.g., dizziness, nausea, vomiting, sweating, fever, difficulty breathing, cough)  Denies: dizziness, N&V, sweating, fever, cough    Pt states she has definitely been more SOB with exertion lately. Pt is not normally SOB at rest.    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 She doesn't think she would notice if she had palpitations/irregular/racing heart beat.    Protocols used: CHEST PAIN-A-[place]    Protocol recommends Pt be seen today in office to R/O unstable angina. The patient indicates understanding of these issues and agrees with the plan. Care advice/recommendations given per protocol. The patient indicates understanding of these issues and agrees with the plan. Pt transferred to scheduling line, to set up appointment:    Next 5 appointments (look out 90 days)    2020  1:40 PM CDT  SHORT with FE Bradley  [facility] ([facility]) [address] [place] [address]  [phone]        Kourtney Mcallister RN .............. 6/25/2020  8:58 AM

## 2020-06-29 LAB — INTERPRETATION ECG - MUSE: NORMAL

## 2020-08-06 ENCOUNTER — ALLIED HEALTH/NURSE VISIT (OUTPATIENT)
Dept: FAMILY MEDICINE | Facility: OTHER | Age: 60
End: 2020-08-06
Attending: FAMILY MEDICINE
Payer: COMMERCIAL

## 2020-08-06 DIAGNOSIS — R50.9 FEVER AND CHILLS: Primary | ICD-10-CM

## 2020-08-06 PROCEDURE — U0003 INFECTIOUS AGENT DETECTION BY NUCLEIC ACID (DNA OR RNA); SEVERE ACUTE RESPIRATORY SYNDROME CORONAVIRUS 2 (SARS-COV-2) (CORONAVIRUS DISEASE [COVID-19]), AMPLIFIED PROBE TECHNIQUE, MAKING USE OF HIGH THROUGHPUT TECHNOLOGIES AS DESCRIBED BY CMS-2020-01-R: HCPCS | Mod: ZL | Performed by: FAMILY MEDICINE

## 2020-08-06 PROCEDURE — 99207 ZZC NO CHARGE NURSE ONLY: CPT

## 2020-08-06 PROCEDURE — C9803 HOPD COVID-19 SPEC COLLECT: HCPCS

## 2020-08-07 LAB
SARS-COV-2 RNA SPEC QL NAA+PROBE: NOT DETECTED
SPECIMEN SOURCE: NORMAL

## 2020-09-15 DIAGNOSIS — E78.2 MIXED HYPERLIPIDEMIA: ICD-10-CM

## 2020-09-16 RX ORDER — ATORVASTATIN CALCIUM 10 MG/1
10 TABLET, FILM COATED ORAL DAILY
Qty: 90 TABLET | Refills: 0 | Status: SHIPPED | OUTPATIENT
Start: 2020-09-16 | End: 2020-11-09

## 2020-09-16 RX ORDER — ATORVASTATIN CALCIUM 10 MG/1
TABLET, FILM COATED ORAL
Qty: 90 TABLET | Refills: 0 | Status: SHIPPED | OUTPATIENT
Start: 2020-09-16 | End: 2020-09-16

## 2020-09-16 NOTE — TELEPHONE ENCOUNTER
[facility] sent Rx request for the following:      ATORVASTATIN 10 MG TABLET   Sig: TAKE 1 TABLET BY MOUTH EVERY DAY      Last Prescription Date:   6/25/2020  Last Fill Qty/Refills:         90, R-0    Last Office Visit:              6/25/2020   Future Office visit:           none    Prescription refilled per RN Medication Refill Policy.................... Fareed Gibbs RN ....................  9/16/2020   10:53 AM

## 2020-09-16 NOTE — TELEPHONE ENCOUNTER
Medication failed to send to pharmacy.  Will resend    Outpatient Medication Detail    Disp  Refills  Start  End  MELY    atorvastatin (LIPITOR) 10 MG tablet  90 tablet  0  9/16/2020   No    Sig: TAKE 1 TABLET BY MOUTH EVERY DAY    Sent to pharmacy as: Atorvastatin Calcium 10 MG Oral Tablet (LIPITOR)    Class: E-Prescribe    Order: [id number]    E-Prescribing Status: Transmission to pharmacy failed (9/16/2020 10:54 AM CDT)      Receipt confirmed by pharmacy-resend successful    Debbie Allred RN  ....................  9/16/2020   2:45 PM

## 2020-10-30 ENCOUNTER — TELEPHONE (OUTPATIENT)
Dept: FAMILY MEDICINE | Facility: OTHER | Age: 60
End: 2020-10-30

## 2020-10-30 DIAGNOSIS — F33.42 RECURRENT MAJOR DEPRESSIVE DISORDER, IN FULL REMISSION (H): ICD-10-CM

## 2020-10-30 RX ORDER — PAROXETINE 40 MG/1
40 TABLET, FILM COATED ORAL DAILY
Qty: 90 TABLET | Refills: 3 | Status: SHIPPED | OUTPATIENT
Start: 2020-10-30 | End: 2020-11-09

## 2020-10-30 NOTE — TELEPHONE ENCOUNTER
Patient is all out of Paroxetine and doesn't have appt until 9th please call today to see if she can get some until then

## 2020-11-09 ENCOUNTER — OFFICE VISIT (OUTPATIENT)
Dept: FAMILY MEDICINE | Facility: OTHER | Age: 60
End: 2020-11-09
Attending: NURSE PRACTITIONER
Payer: COMMERCIAL

## 2020-11-09 VITALS
HEART RATE: 60 BPM | SYSTOLIC BLOOD PRESSURE: 118 MMHG | DIASTOLIC BLOOD PRESSURE: 72 MMHG | TEMPERATURE: 97.7 F | RESPIRATION RATE: 12 BRPM | BODY MASS INDEX: 28.92 KG/M2 | WEIGHT: 159.4 LBS | OXYGEN SATURATION: 96 %

## 2020-11-09 DIAGNOSIS — Z23 NEED FOR INFLUENZA VACCINATION: Primary | ICD-10-CM

## 2020-11-09 DIAGNOSIS — F33.42 RECURRENT MAJOR DEPRESSIVE DISORDER, IN FULL REMISSION (H): ICD-10-CM

## 2020-11-09 DIAGNOSIS — E78.2 MIXED HYPERLIPIDEMIA: ICD-10-CM

## 2020-11-09 PROCEDURE — 99213 OFFICE O/P EST LOW 20 MIN: CPT | Mod: 25 | Performed by: NURSE PRACTITIONER

## 2020-11-09 PROCEDURE — 90471 IMMUNIZATION ADMIN: CPT | Performed by: NURSE PRACTITIONER

## 2020-11-09 PROCEDURE — 90686 IIV4 VACC NO PRSV 0.5 ML IM: CPT | Performed by: NURSE PRACTITIONER

## 2020-11-09 RX ORDER — ATORVASTATIN CALCIUM 10 MG/1
10 TABLET, FILM COATED ORAL DAILY
Qty: 90 TABLET | Refills: 3 | Status: SHIPPED | OUTPATIENT
Start: 2020-11-09 | End: 2022-05-19

## 2020-11-09 RX ORDER — PAROXETINE 40 MG/1
40 TABLET, FILM COATED ORAL DAILY
Qty: 90 TABLET | Refills: 3 | Status: SHIPPED | OUTPATIENT
Start: 2020-11-09 | End: 2022-01-31

## 2020-11-09 ASSESSMENT — PAIN SCALES - GENERAL: PAINLEVEL: NO PAIN (0)

## 2020-11-09 NOTE — NURSING NOTE
"Chief Complaint   Patient presents with     Recheck Medication     Patient is fasting     Patient would like to know how much a stress test costs because her insurance does not cover it, and she would like to get it done soon.    Initial /72 (BP Location: Right arm, Patient Position: Sitting, Cuff Size: Adult Regular)   Pulse 60   Temp 97.7  F (36.5  C) (Temporal)   Resp 12   Wt 72.3 kg (159 lb 6.4 oz)   SpO2 96%   Breastfeeding No   BMI 28.92 kg/m   Estimated body mass index is 28.92 kg/m  as calculated from the following:    Height as of 6/25/20: 1.581 m (5' 2.25\").    Weight as of this encounter: 72.3 kg (159 lb 6.4 oz).  Medication Reconciliation: Completed     Roland Sanchez LPN  "

## 2020-11-09 NOTE — PROGRESS NOTES
HPI:    Josie Wilson is a 60 year old female who presents to clinic today for medication management.  She needs refills of paroxetine and atorvastatin today.  She reports that paroxetine is working well and has no concerns.  She feels this is controlling depression anxiety and symptoms well.  She is currently taking aspirin 81 mg daily and atorvastatin 10 mg daily.  She did have lipid as well as other labs completed June 2020.  At that time she was seen for some chest pains and shortness of breath.  She has not had any further shortness of breath but reports occasional breath hiccups at night.  Denies any current other concerns at this time.  She does report that she was supposed to have a stress test but does not know how much this costs and would like to look into this further before scheduling due to poor insurance coverage.  She is due for mammogram.  She will look into getting set up with the [facility] program.    Past Medical History:   Diagnosis Date     Anemia     No Comments Provided     Excessive and frequent menstruation with regular cycle     No Comments Provided     Major depressive disorder, single episode     History of depression     Personal history of other medical treatment (CODE)     age 14,for mono       Past Surgical History:   Procedure Laterality Date     COLONOSCOPY  07/24/2014 7/24/2014,10 year follow up     HYSTERECTOMY TOTAL ABDOMINAL      2004     LAPAROSCOPIC CHOLECYSTECTOMY      2/98     OTHER SURGICAL HISTORY      3/6/12,[id number],AMPUTATION,revision of left index finger, Dr Cortez       Family History   Problem Relation Age of Onset     Substance Abuse Mother         Alcohol/Drug,Problems with alcohol abuse     Heart Disease Father         Heart Disease,CAD, MI, did smoke     Diabetes Father         Diabetes     Hypertension Father         Hypertension     Hypertension Sister         Hypertension     Substance Abuse Sister      Bronchitis Sister      Heart Disease Sister       Hypertension Sister         Hypertension     Other - See Comments Other         Psychiatric illness,Depression, probably committed suicide     Breast Cancer Maternal Aunt         Cancer-breast     Cancer Maternal Aunt         Cancer, of pharyngeal cancer age 68     Heart Disease Other         Heart Disease,MI     Other - See Comments Other         smoked     Diabetes Other         Diabetes     Heart Disease Other         Heart Disease,CAD     Heart Disease Other         Heart Disease,CAD     Cancer Other         Cancer,Testicular cancer.     Hypertension Brother      Hypertension Brother      Hypertension Brother        Social History     Socioeconomic History     Marital status:      Spouse name: Not on file     Number of children: Not on file     Years of education: Not on file     Highest education level: Not on file   Occupational History     Not on file   Social Needs     Financial resource strain: Not on file     Food insecurity     Worry: Not on file     Inability: Not on file     Transportation needs     Medical: Not on file     Non-medical: Not on file   Tobacco Use     Smoking status: Never Smoker     Smokeless tobacco: Never Used   Substance and Sexual Activity     Alcohol use: No     Drug use: Never     Comment: Drug use: No     Sexual activity: Not Currently   Lifestyle     Physical activity     Days per week: Not on file     Minutes per session: Not on file     Stress: Not on file   Relationships     Social connections     Talks on phone: Not on file     Gets together: Not on file     Attends Sikhism service: Not on file     Active member of club or organization: Not on file     Attends meetings of clubs or organizations: Not on file     Relationship status: Not on file     Intimate partner violence     Fear of current or ex partner: Not on file     Emotionally abused: Not on file     Physically abused: Not on file     Forced sexual activity: Not on file   Other Topics Concern     Not on  file   Social History Narrative    Lives with her .  ,  is disabled. 3 children, adults       Current Outpatient Medications   Medication Sig Dispense Refill     atorvastatin (LIPITOR) 10 MG tablet Take 1 tablet (10 mg) by mouth daily 90 tablet 3     PARoxetine (PAXIL) 40 MG tablet Take 1 tablet (40 mg) by mouth daily 90 tablet 3     aspirin (ASA) 81 MG EC tablet Take 1 tablet (81 mg) by mouth daily 90 tablet 3     glucosamine-chondroitin 500-400 MG CAPS per capsule Take 1 capsule by mouth daily       loratadine (CLARITIN) 10 MG tablet Take 10 mg by mouth daily       Multiple Vitamin (MULTI-VITAMINS) TABS Take 1 tablet by mouth daily         Allergies   Allergen Reactions     Amoxicillin Rash       ROS:  Pertinent positives and negatives are noted in HPI.    EXAM:  /72 (BP Location: Right arm, Patient Position: Sitting, Cuff Size: Adult Regular)   Pulse 60   Temp 97.7  F (36.5  C) (Temporal)   Resp 12   Wt 72.3 kg (159 lb 6.4 oz)   SpO2 96%   Breastfeeding No   BMI 28.92 kg/m    General appearance: well appearing female, in no acute distress  Respiratory: clear to auscultation bilaterally  Cardiac: Irregular rhythm with no murmurs  Psychological: normal affect, alert and pleasant    ASSESSMENT AND PLAN:    1. Need for influenza vaccination    2. Mixed hyperlipidemia    3. Recurrent major depressive disorder, in full remission (H)      Influenza vaccine updated today.  No need for lipid panel to be updated at this was completed in June 2020.  Refilled atorvastatin x1 year.  Refill paroxetine x1 year.  She will reach out to financial advocates regarding cost of stress test and get this scheduled if not cost prohibitive.  Information given on [facility] program for mammogram.      ALVERTO Haines CNP..................11/9/2020 8:57 AM      This document was prepared using voice generated software.  While every attempt was made for accuracy, grammatical errors may exist.

## 2020-12-17 ENCOUNTER — OFFICE VISIT (OUTPATIENT)
Dept: FAMILY MEDICINE | Facility: OTHER | Age: 60
End: 2020-12-17
Attending: NURSE PRACTITIONER
Payer: COMMERCIAL

## 2020-12-17 VITALS
DIASTOLIC BLOOD PRESSURE: 84 MMHG | SYSTOLIC BLOOD PRESSURE: 112 MMHG | OXYGEN SATURATION: 98 % | WEIGHT: 160.2 LBS | RESPIRATION RATE: 17 BRPM | HEIGHT: 62 IN | BODY MASS INDEX: 29.48 KG/M2 | HEART RATE: 76 BPM | TEMPERATURE: 95.5 F

## 2020-12-17 DIAGNOSIS — Z12.31 ENCOUNTER FOR SCREENING MAMMOGRAM FOR BREAST CANCER: Primary | ICD-10-CM

## 2020-12-17 PROCEDURE — 99213 OFFICE O/P EST LOW 20 MIN: CPT | Performed by: NURSE PRACTITIONER

## 2020-12-17 ASSESSMENT — PATIENT HEALTH QUESTIONNAIRE - PHQ9: SUM OF ALL RESPONSES TO PHQ QUESTIONS 1-9: 0

## 2020-12-17 ASSESSMENT — MIFFLIN-ST. JEOR: SCORE: 1243.88

## 2020-12-17 ASSESSMENT — PAIN SCALES - GENERAL: PAINLEVEL: NO PAIN (0)

## 2020-12-17 NOTE — NURSING NOTE
"Chief Complaint   Patient presents with     Breast Exam     Patient presents to clinic for routine breast exam, has no concerns to talk about at this time.     Initial /84 (BP Location: Right arm, Patient Position: Sitting, Cuff Size: Adult Regular)   Pulse 76   Temp 95.5  F (35.3  C) (Temporal)   Resp 17   Ht 1.581 m (5' 2.25\")   Wt 72.7 kg (160 lb 3.2 oz)   SpO2 98%   BMI 29.07 kg/m   Estimated body mass index is 29.07 kg/m  as calculated from the following:    Height as of this encounter: 1.581 m (5' 2.25\").    Weight as of this encounter: 72.7 kg (160 lb 3.2 oz).         Medication Reconciliation: Complete      Katherine Carlson   "

## 2020-12-17 NOTE — PROGRESS NOTES
"HPI:    Josie Wilson is a 60 year old female who presents to clinic today for Percy breast exam.  Last mammogram with ultrasound was completed April 2018.  She denies any current breast concerns.  History of hysterectomy, no Pap smear needed.    Past Medical History:   Diagnosis Date     Anemia     No Comments Provided     Excessive and frequent menstruation with regular cycle     No Comments Provided     Major depressive disorder, single episode     History of depression     Personal history of other medical treatment (CODE)     age 14,for mono         Current Outpatient Medications   Medication Sig Dispense Refill     aspirin (ASA) 81 MG EC tablet Take 1 tablet (81 mg) by mouth daily 90 tablet 3     atorvastatin (LIPITOR) 10 MG tablet Take 1 tablet (10 mg) by mouth daily 90 tablet 3     Multiple Vitamin (MULTI-VITAMINS) TABS Take 1 tablet by mouth daily       PARoxetine (PAXIL) 40 MG tablet Take 1 tablet (40 mg) by mouth daily 90 tablet 3     loratadine (CLARITIN) 10 MG tablet Take 10 mg by mouth daily         Allergies   Allergen Reactions     Amoxicillin Rash       ROS:  Pertinent positives and negatives are noted in HPI.    EXAM:  /84 (BP Location: Right arm, Patient Position: Sitting, Cuff Size: Adult Regular)   Pulse 76   Temp 95.5  F (35.3  C) (Temporal)   Resp 17   Ht 1.581 m (5' 2.25\")   Wt 72.7 kg (160 lb 3.2 oz)   SpO2 98%   BMI 29.07 kg/m    General appearance: well appearing female, in no acute distress  Breast: Normal breast exam with no nipple inversion or dimpling, no masses noted  Psychological: normal affect, alert and pleasant    ASSESSMENT AND PLAN:    1. Encounter for screening mammogram for breast cancer      Breast exam completed and no abnormalities noted.  Forms completed for [facility]cy program and mammogram is scheduled.      ALVERTO Haines CNP..................12/17/2020 8:48 AM      This document was prepared using voice generated software.  While every attempt was made " for accuracy, grammatical errors may exist.

## 2021-01-03 ENCOUNTER — HEALTH MAINTENANCE LETTER (OUTPATIENT)
Age: 61
End: 2021-01-03

## 2021-01-07 ENCOUNTER — HOSPITAL ENCOUNTER (OUTPATIENT)
Dept: MAMMOGRAPHY | Facility: OTHER | Age: 61
Discharge: HOME | End: 2021-01-07
Attending: NURSE PRACTITIONER | Admitting: NURSE PRACTITIONER
Payer: COMMERCIAL

## 2021-01-07 DIAGNOSIS — Z12.31 VISIT FOR SCREENING MAMMOGRAM: ICD-10-CM

## 2021-01-07 PROCEDURE — 77063 BREAST TOMOSYNTHESIS BI: CPT

## 2021-03-06 ENCOUNTER — HEALTH MAINTENANCE LETTER (OUTPATIENT)
Age: 61
End: 2021-03-06

## 2021-03-26 ENCOUNTER — IMMUNIZATION (OUTPATIENT)
Dept: FAMILY MEDICINE | Facility: OTHER | Age: 61
End: 2021-03-26
Attending: NURSE PRACTITIONER
Payer: COMMERCIAL

## 2021-03-26 PROCEDURE — 91300 PR COVID VAC PFIZER DIL RECON 30 MCG/0.3 ML IM: CPT

## 2021-03-26 PROCEDURE — 0001A PR COVID VAC PFIZER DIL RECON 30 MCG/0.3 ML IM: CPT

## 2021-04-16 ENCOUNTER — IMMUNIZATION (OUTPATIENT)
Dept: FAMILY MEDICINE | Facility: OTHER | Age: 61
End: 2021-04-16
Attending: FAMILY MEDICINE
Payer: COMMERCIAL

## 2021-04-16 PROCEDURE — 91300 PR COVID VAC PFIZER DIL RECON 30 MCG/0.3 ML IM: CPT

## 2021-04-16 PROCEDURE — 0002A PR COVID VAC PFIZER DIL RECON 30 MCG/0.3 ML IM: CPT

## 2021-07-12 DIAGNOSIS — E78.2 MIXED HYPERLIPIDEMIA: Primary | ICD-10-CM

## 2021-07-13 RX ORDER — ASPIRIN 81 MG/1
TABLET, COATED ORAL
Qty: 90 TABLET | Refills: 0 | Status: SHIPPED | OUTPATIENT
Start: 2021-07-13 | End: 2021-10-26

## 2021-07-13 NOTE — TELEPHONE ENCOUNTER
[id number] [place] sent Rx request for the following:      Requested Prescriptions   Pending Prescriptions Disp Refills   ASPIRIN LOW DOSE 81 MG EC tablet [Pharmacy Med Name: ASPIRIN EC 81 MG TABLET] 90 tablet 3    Sig: TAKE 1 TABLET BY MOUTH EVERY DAY   Last Prescription Date:   6/25/20  Last Fill Qty/Refills:         90, R-3    Last Office Visit:              12/17/20   Future Office visit:           None     Prescription approved per [facility] Refill Protocol for 90 days at this time. Kourtney Mcallister RN .............. 7/13/2021  3:05 PM

## 2021-10-09 ENCOUNTER — HEALTH MAINTENANCE LETTER (OUTPATIENT)
Age: 61
End: 2021-10-09

## 2021-10-22 DIAGNOSIS — E78.2 MIXED HYPERLIPIDEMIA: ICD-10-CM

## 2021-10-26 RX ORDER — ASPIRIN 81 MG/1
TABLET, COATED ORAL
Qty: 90 TABLET | Refills: 0 | Status: SHIPPED | OUTPATIENT
Start: 2021-10-26 | End: 2022-01-27

## 2021-10-26 NOTE — TELEPHONE ENCOUNTER
Prescription refilled per RN Medication Refill Policy..................Suze Frazier RN 10/26/2021 8:38 AM

## 2022-01-26 DIAGNOSIS — E78.2 MIXED HYPERLIPIDEMIA: ICD-10-CM

## 2022-01-26 NOTE — LETTER
January 27, 2022      Josie Wilson  [address] [address] [address]        Dear Josie,       A refill of ASPIRIN LOW DOSE 81 MG EC tablet has been requested by your pharmacy and we noticed that you are overdue for an annual exam.  Your last comprehensive visit with Lakisha CORTEZ was on 12/17/2020.    This refill request has been sent to your provider for consideration at this time.    Your health is very important to us.  Please call the clinic at [phone] to schedule your appointment.    Thank you for choosing [facility] and [facility] for your health care needs.    Sincerely,    Refill ROBERTO  [facility]

## 2022-01-27 RX ORDER — ASPIRIN 81 MG/1
TABLET, COATED ORAL
Qty: 90 TABLET | Refills: 0 | Status: SHIPPED | OUTPATIENT
Start: 2022-01-27 | End: 2022-06-16

## 2022-01-27 NOTE — TELEPHONE ENCOUNTER
"[facility] sent Rx request for the following:   ASPIRIN LOW DOSE 81 MG EC tablet  SigTAKE 1 TABLET BY MOUTH EVERY DAY    Last Prescription Date:   10/26/2021  Last Fill Qty/Refills:         90, R-0    Last Office Visit:              12/17/2020 (De La Cruz)   Future Office visit:           None noted   Analgesics (Non-Narcotic Tylenol and ASA Only) Failed - 1/26/2022 12:33 AM        Failed - Recent (12 mo) or future (30 days) visit within the authorizing provider's specialty     Patient has had an office visit with the authorizing provider or a provider within the authorizing providers department within the previous 12 mos or has a future within next 30 days. See \"Patient Info\" tab in inbasket, or \"Choose Columns\" in Meds & Orders section of the refill encounter.           Appointment reminder letter sent via [employer].   Unable to complete prescription refill per RN Medication Refill Policy.................... Leslie Gonzales RN ....................  1/27/2022   8:36 AM        "

## 2022-01-30 DIAGNOSIS — F33.42 RECURRENT MAJOR DEPRESSIVE DISORDER, IN FULL REMISSION (H): ICD-10-CM

## 2022-01-30 NOTE — LETTER
[facility]  [address]  [place] [id number] [phone]      January 31, 2022    Josie Wilson  [address] [address] [address]    Dear Josie Wilson,     We recently received a refill request for Paroxetine (Paxil) 40 mg tablet prescribed by Lakisha De La Cruz.      We have sent your request to Lakisha De La Cruz for consideration.    In order to get any additional refills, call our scheduling center at [phone] to request a visit with your primary care provider for an annual physical.        Thank you,       Refill Nurse

## 2022-01-31 RX ORDER — PAROXETINE 40 MG/1
TABLET, FILM COATED ORAL
Qty: 90 TABLET | Refills: 0 | Status: SHIPPED | OUTPATIENT
Start: 2022-01-31 | End: 2022-05-09

## 2022-01-31 NOTE — TELEPHONE ENCOUNTER
"Requested Prescriptions   Pending Prescriptions Disp Refills     PARoxetine (PAXIL) 40 MG tablet [Pharmacy Med Name: PAROXETINE HCL 40 MG TABLET] 90 tablet 3     Sig: TAKE 1 TABLET BY MOUTH EVERY DAY       SSRIs Protocol Failed - 1/30/2022  7:50 AM        Failed - PHQ-9 score less than 5 in past 6 months     Please review last PHQ-9 score.           Failed - Recent (6 mo) or future (30 days) visit within the authorizing provider's specialty     Patient had office visit in the last 6 months or has a visit in the next 30 days with authorizing provider or within the authorizing provider's specialty.  See \"Patient Info\" tab in inbasket, or \"Choose Columns\" in Meds & Orders section of the refill encounter.            Passed - Medication is active on med list        Passed - Patient is age 18 or older        Passed - No active pregnancy on record        Passed - No positive pregnancy test in last 12 months     Last Written Prescription Date:  11/9/20  Last Fill Quantity: 90,   # refills: 3  Last Office Visit: 12/17/20 HARVEY De La Cruz  Future Office visit:  none   Letter sent to patient re: need to schedule annul exam.    Routing refill request to provider for review/approval because:  Unable to fill prescription refills per RN Medicine Refill Policy.  Brenda J. Goodell, RN on 1/31/2022 at 12:58 PM      "

## 2022-03-26 ENCOUNTER — HEALTH MAINTENANCE LETTER (OUTPATIENT)
Age: 62
End: 2022-03-26

## 2022-05-04 DIAGNOSIS — E78.2 MIXED HYPERLIPIDEMIA: ICD-10-CM

## 2022-05-04 DIAGNOSIS — F33.42 RECURRENT MAJOR DEPRESSIVE DISORDER, IN FULL REMISSION (H): ICD-10-CM

## 2022-05-04 RX ORDER — PAROXETINE 40 MG/1
TABLET, FILM COATED ORAL
Qty: 90 TABLET | Refills: 0 | OUTPATIENT
Start: 2022-05-04

## 2022-05-04 RX ORDER — ASPIRIN 81 MG/1
TABLET, COATED ORAL
Qty: 90 TABLET | Refills: 0 | OUTPATIENT
Start: 2022-05-04

## 2022-05-04 NOTE — TELEPHONE ENCOUNTER
[id number] [place] [facility] Rx request for the following:      Requested Prescriptions   Pending Prescriptions Disp Refills     PARoxetine (PAXIL) 40 MG tablet [Pharmacy Med Name: PAROXETINE HCL 40 MG TABLET] 90 tablet 0     Sig: TAKE 1 TABLET BY MOUTH EVERY DAY   Last Prescription Date:   1/31/22  Last Fill Qty/Refills:         90, R-0      SSRIs Protocol Failed - 5/4/2022 10:51 AM        Failed - PHQ-9 score less than 5 in past 6 months     Please review last PHQ-9 score.           Failed - Recent (6 mo) or future (30 days) visit within the authorizing provider's specialty        ASPIRIN LOW DOSE 81 MG EC tablet [Pharmacy Med Name: ASPIRIN EC 81 MG TABLET] 90 tablet 0     Sig: TAKE 1 TABLET BY MOUTH EVERY DAY   Last Prescription Date:   1/27/22  Last Fill Qty/Refills:         90, R-0      Analgesics (Non-Narcotic Tylenol and ASA Only) Failed - 5/4/2022 10:51 AM        Failed - Recent (12 mo) or future (30 days) visit within the authorizing provider's specialty     Last Office Visit:              12/17/20  Future Office visit:           None    PHQ-9 overdue since 6/17/21.    Per last refills, Lakisha De La Cruz noted Pt needs appointment for future refills. Refused, with note to pharmacy. Kourtney Mcallister RN .............. 5/4/2022  10:53 AM

## 2022-05-06 DIAGNOSIS — F33.42 RECURRENT MAJOR DEPRESSIVE DISORDER, IN FULL REMISSION (H): ICD-10-CM

## 2022-05-06 NOTE — LETTER
May 9, 2022      Josie Wilson  [address] [address] [address]        Dear Josie,     A refill request was received from your pharmacy for Paxil.    Additional refills require an office visit with Lakisha De La Cruz for annual review.    Please call [phone] to schedule an appointment.        Sincerely,      Refill Nurse

## 2022-05-09 RX ORDER — PAROXETINE 40 MG/1
TABLET, FILM COATED ORAL
Qty: 90 TABLET | Refills: 3 | Status: SHIPPED | OUTPATIENT
Start: 2022-05-09 | End: 2022-05-19

## 2022-05-09 NOTE — TELEPHONE ENCOUNTER
Routing refill request to provider for review/approval because:    LOV: 12/17/20  Patient due for annual review.  Letter and my chart message sent and routed to  outreach.    Sofía Mcmillan RN on 5/9/2022 at 1:45 PM

## 2022-05-19 ENCOUNTER — OFFICE VISIT (OUTPATIENT)
Dept: FAMILY MEDICINE | Facility: OTHER | Age: 62
End: 2022-05-19
Attending: NURSE PRACTITIONER
Payer: COMMERCIAL

## 2022-05-19 VITALS
WEIGHT: 159.6 LBS | HEIGHT: 62 IN | RESPIRATION RATE: 16 BRPM | DIASTOLIC BLOOD PRESSURE: 80 MMHG | TEMPERATURE: 98.5 F | SYSTOLIC BLOOD PRESSURE: 118 MMHG | OXYGEN SATURATION: 96 % | HEART RATE: 76 BPM | BODY MASS INDEX: 29.37 KG/M2

## 2022-05-19 DIAGNOSIS — E78.5 HYPERLIPIDEMIA LDL GOAL <100: ICD-10-CM

## 2022-05-19 DIAGNOSIS — F33.42 RECURRENT MAJOR DEPRESSIVE DISORDER, IN FULL REMISSION (H): ICD-10-CM

## 2022-05-19 DIAGNOSIS — Z00.00 ROUTINE HISTORY AND PHYSICAL EXAMINATION OF ADULT: Primary | ICD-10-CM

## 2022-05-19 LAB
ALBUMIN SERPL-MCNC: 4.5 G/DL (ref 3.5–5.7)
ALP SERPL-CCNC: 71 U/L (ref 34–104)
ALT SERPL W P-5'-P-CCNC: 19 U/L (ref 7–52)
ANION GAP SERPL CALCULATED.3IONS-SCNC: 4 MMOL/L (ref 3–14)
AST SERPL W P-5'-P-CCNC: 18 U/L (ref 13–39)
BASOPHILS # BLD AUTO: 0.1 10E3/UL (ref 0–0.2)
BASOPHILS NFR BLD AUTO: 1 %
BILIRUB SERPL-MCNC: 0.6 MG/DL (ref 0.3–1)
BUN SERPL-MCNC: 14 MG/DL (ref 7–25)
CALCIUM SERPL-MCNC: 9.8 MG/DL (ref 8.6–10.3)
CHLORIDE BLD-SCNC: 103 MMOL/L (ref 98–107)
CHOLEST SERPL-MCNC: 201 MG/DL
CO2 SERPL-SCNC: 32 MMOL/L (ref 21–31)
CREAT SERPL-MCNC: 0.81 MG/DL (ref 0.6–1.2)
EOSINOPHIL # BLD AUTO: 0.2 10E3/UL (ref 0–0.7)
EOSINOPHIL NFR BLD AUTO: 2 %
ERYTHROCYTE [DISTWIDTH] IN BLOOD BY AUTOMATED COUNT: 12.5 % (ref 10–15)
FASTING STATUS PATIENT QL REPORTED: YES
GFR SERPL CREATININE-BSD FRML MDRD: 81 ML/MIN/1.73M2
GLUCOSE BLD-MCNC: 93 MG/DL (ref 70–105)
HCT VFR BLD AUTO: 42 % (ref 35–47)
HDLC SERPL-MCNC: 45 MG/DL (ref 23–92)
HGB BLD-MCNC: 13.6 G/DL (ref 11.7–15.7)
IMM GRANULOCYTES # BLD: 0 10E3/UL
IMM GRANULOCYTES NFR BLD: 0 %
LDLC SERPL CALC-MCNC: 125 MG/DL
LYMPHOCYTES # BLD AUTO: 2.5 10E3/UL (ref 0.8–5.3)
LYMPHOCYTES NFR BLD AUTO: 30 %
MCH RBC QN AUTO: 28.4 PG (ref 26.5–33)
MCHC RBC AUTO-ENTMCNC: 32.4 G/DL (ref 31.5–36.5)
MCV RBC AUTO: 88 FL (ref 78–100)
MONOCYTES # BLD AUTO: 0.6 10E3/UL (ref 0–1.3)
MONOCYTES NFR BLD AUTO: 7 %
NEUTROPHILS # BLD AUTO: 5.1 10E3/UL (ref 1.6–8.3)
NEUTROPHILS NFR BLD AUTO: 60 %
NONHDLC SERPL-MCNC: 156 MG/DL
NRBC # BLD AUTO: 0 10E3/UL
NRBC BLD AUTO-RTO: 0 /100
PLATELET # BLD AUTO: 360 10E3/UL (ref 150–450)
POTASSIUM BLD-SCNC: 4.3 MMOL/L (ref 3.5–5.1)
PROT SERPL-MCNC: 7.1 G/DL (ref 6.4–8.9)
RBC # BLD AUTO: 4.79 10E6/UL (ref 3.8–5.2)
SODIUM SERPL-SCNC: 139 MMOL/L (ref 134–144)
TRIGL SERPL-MCNC: 153 MG/DL
WBC # BLD AUTO: 8.5 10E3/UL (ref 4–11)

## 2022-05-19 PROCEDURE — 85025 COMPLETE CBC W/AUTO DIFF WBC: CPT | Mod: ZL | Performed by: NURSE PRACTITIONER

## 2022-05-19 PROCEDURE — 80053 COMPREHEN METABOLIC PANEL: CPT | Mod: ZL | Performed by: NURSE PRACTITIONER

## 2022-05-19 PROCEDURE — 99396 PREV VISIT EST AGE 40-64: CPT | Performed by: NURSE PRACTITIONER

## 2022-05-19 PROCEDURE — 36415 COLL VENOUS BLD VENIPUNCTURE: CPT | Mod: ZL | Performed by: NURSE PRACTITIONER

## 2022-05-19 PROCEDURE — 80061 LIPID PANEL: CPT | Mod: ZL | Performed by: NURSE PRACTITIONER

## 2022-05-19 RX ORDER — PAROXETINE 40 MG/1
40 TABLET, FILM COATED ORAL DAILY
Qty: 90 TABLET | Refills: 4 | Status: SHIPPED | OUTPATIENT
Start: 2022-05-19 | End: 2023-08-08

## 2022-05-19 ASSESSMENT — PATIENT HEALTH QUESTIONNAIRE - PHQ9: SUM OF ALL RESPONSES TO PHQ QUESTIONS 1-9: 0

## 2022-05-19 ASSESSMENT — PAIN SCALES - GENERAL: PAINLEVEL: NO PAIN (0)

## 2022-05-19 NOTE — PROGRESS NOTES
There are no exam notes on file for this visit.    ANNUAL PHYSICAL - FEMALE    HPI: Josie Wilson presents for a yearly exam.      Concerns include: Right fourth finger is catching at times when she tries to open up a fist, she does have some pain that radiates down into the palm of her hand.    She reports her  has been drinking heavily over the winter and she is looking into couples counseling.  She would like to have some resources where she can go to do this.  She does feel he would be open to counseling.    No LMP recorded. Patient has had a hysterectomy.     Last pap: total hysterectomy, no longer needs pap smears  FH of early CA?:  No  Cholesterol/DM concerns/screening: Due  Tobacco?:  No  Calcium intake: Dietary  DEXA: recommend age 65  Last mammo: 1/2021  Colonoscopy: last 7/2014-due 7/2024  Immunizations: recommend covid booster, shingles vaccine    Patient Active Problem List    Diagnosis Date Noted     PVC's (premature ventricular contractions) 06/25/2020     Priority: Medium     Mixed hyperlipidemia 06/25/2020     Priority: Medium     Hyperlipidemia LDL goal <100 03/26/2018     Priority: Medium     Anemia 01/30/2018     Priority: Medium     Depression 12/12/2016     Priority: Medium     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), 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 03/06/2012     Priority: Medium       Past Medical History:   Diagnosis Date     Anemia     No Comments Provided     Excessive and frequent menstruation with regular cycle     No Comments Provided     Major depressive disorder, single episode     History of depression     Personal history of other medical treatment (CODE)     age 14,for mono       Past Surgical History:   Procedure Laterality Date     COLONOSCOPY  07/24/2014 7/24/2014,10 year follow up     HYSTERECTOMY TOTAL ABDOMINAL      2004     LAPAROSCOPIC CHOLECYSTECTOMY      2/98     OTHER SURGICAL HISTORY      3/6/12,[id number],AMPUTATION,revision of left index  Dr Diego persaud       Family History   Problem Relation Age of Onset     Substance Abuse Mother         Alcohol/Drug,Problems with alcohol abuse     Heart Disease Father         Heart Disease,CAD, MI, did smoke     Diabetes Father         Diabetes     Hypertension Father         Hypertension     Hypertension Sister         Hypertension     Substance Abuse Sister      Bronchitis Sister      Heart Disease Sister      Hypertension Sister         Hypertension     Other - See Comments Other         Psychiatric illness,Depression, probably committed suicide     Breast Cancer Maternal Aunt         Cancer-breast     Cancer Maternal Aunt         Cancer, of pharyngeal cancer age 68     Heart Disease Other         Heart Disease,MI     Other - See Comments Other         smoked     Diabetes Other         Diabetes     Heart Disease Other         Heart Disease,CAD     Heart Disease Other         Heart Disease,CAD     Cancer Other         Cancer,Testicular cancer.     Hypertension Brother      Hypertension Brother      Hypertension Brother        Social History     Socioeconomic History     Marital status:      Spouse name: Not on file     Number of children: Not on file     Years of education: Not on file     Highest education level: Not on file   Occupational History     Not on file   Tobacco Use     Smoking status: Never Smoker     Smokeless tobacco: Never Used   Vaping Use     Vaping Use: Never used   Substance and Sexual Activity     Alcohol use: No     Drug use: Never     Comment: Drug use: No     Sexual activity: Yes     Partners: Male   Other Topics Concern     Not on file   Social History Narrative    Lives with her .  ,  is disabled. 3 children, adults     Social Determinants of Health     Financial Resource Strain: Not on file   Food Insecurity: Not on file   Transportation Needs: Not on file   Physical Activity: Not on file   Stress: Not on file   Social Connections: Not on file  "  Intimate Partner Violence: Not on file   Housing Stability: Not on file       Current Outpatient Medications   Medication Sig Dispense Refill     ASPIRIN LOW DOSE 81 MG EC tablet TAKE 1 TABLET BY MOUTH EVERY DAY 90 tablet 0     docusate sodium (COLACE) 50 MG capsule Take 50 mg by mouth daily       Multiple Vitamin (MULTI-VITAMINS) TABS Take 1 tablet by mouth daily       PARoxetine (PAXIL) 40 MG tablet TAKE 1 TABLET BY MOUTH EVERY DAY 90 tablet 3       Allergies   Allergen Reactions     Amoxicillin Rash       REVIEW OF SYSTEMS:  Complete ROS obtained, all pertinent positives/negatives above    PHYSICAL EXAM:  /80   Pulse 76   Temp 98.5  F (36.9  C)   Resp 16   Ht 1.581 m (5' 2.25\")   Wt 72.4 kg (159 lb 9.6 oz)   SpO2 96%   BMI 28.96 kg/m    CONSTITUTIONAL:  Alert,cooperative, NAD.  EYES: No scleral icterus.  PERRLA.  Conjunctiva clear.  ENT/MOUTH: External ears and nose normal.  TMs normal.  Moist mucous membranes. Oropharynx clear.    ENDO: No thyromegaly or thyroidnodules.  LYMPH:  No cervical or supraclavicular LA.    BREASTS: No skin abnormalities, no erythema.  No discrete masses.  No nipple discharge, no axillary, supra- or infraclavicular LA.   CARDIOVASCULAR: Regular,S1, S2.  No S3 or S4.  No murmur/gallop/rub.  No peripheral edema.  RESPIRATORY: CTA bilaterally, no wheezes, rhonchi or rales.  GI: Bowel sounds wnl.  Soft, nontender, nondistended.  No masses or HSM.  No rebound orguarding.  MSKEL: Grossly normal ROM.  Noclubbing.  INTEGUMENTARY:  Warm, dry.  No rash noted on exposed skin.  NEUROLOGIC: Facies symmetric.  Grossly normal movement and tone.  No tremor.  PSYCHIATRIC: Affect normal.  Speech fluent.      PHQ Depression Screen  PHQ-9 SCORE 12/31/2019 12/17/2020 5/19/2022   PHQ-9 Total Score 0 0 0       Labs:  No results found for any visits on 05/19/22.    ASSESSMENT AND PLAN:    1. Routine history and physical examination of adult    2. Recurrent major depressive disorder, in full " remission (H)    3. Hyperlipidemia LDL goal <100        Declines COVID booster, interested in shingles vaccine labs pending  She did stop her cluster medication, she does not know if she actually ever started this.  She is not interested in medication at this time.  Refilled paroxetine as this has been working well for her.  Resources given for counseling both individual and marriage counseling.  Follow-up annually and as needed.      Relevant cancer screening discussed.    Counseled on healthy diet, Calcium and vitamin D intake, and exercise.    ALVERTO Haines CNP ....................  5/19/2022   8:24 AM       Answers for HPI/ROS submitted by the patient on 5/19/2022  Frequency of exercise:: None  Getting at least 3 servings of Calcium per day:: Yes  Diet:: Regular (no restrictions)  Taking medications regularly:: Yes  Medication side effects:: None  Bi-annual eye exam:: Yes  Dental care twice a year:: NO  Sleep apnea or symptoms of sleep apnea:: None  Additional concerns today:: Yes

## 2022-05-19 NOTE — LETTER
May 19, 2022      Josie Wilson  [address] [address] [address]        Dear ,    We are writing to inform you of your test results.    Your test results fall within the expected range(s) or remain unchanged from previous results.  Please continue with current treatment plan.    Resulted Orders   Comprehensive Metabolic Panel   Result Value Ref Range    Sodium 139 134 - 144 mmol/L    Potassium 4.3 3.5 - 5.1 mmol/L    Chloride 103 98 - 107 mmol/L    Carbon Dioxide (CO2) 32 (H) 21 - 31 mmol/L    Anion Gap 4 3 - 14 mmol/L    Urea Nitrogen 14 7 - 25 mg/dL    Creatinine 0.81 0.60 - 1.20 mg/dL    Calcium 9.8 8.6 - 10.3 mg/dL    Glucose 93 70 - 105 mg/dL    Alkaline Phosphatase 71 34 - 104 U/L    AST 18 13 - 39 U/L    ALT 19 7 - 52 U/L    Protein Total 7.1 6.4 - 8.9 g/dL    Albumin 4.5 3.5 - 5.7 g/dL    Bilirubin Total 0.6 0.3 - 1.0 mg/dL    GFR Estimate 81 >60 mL/min/1.73m2      Comment:      Effective December 21, 2021 eGFRcr in adults is calculated using the 2021 CKD-EPI creatinine equation which includes age and gender (Filemon et al., NEJ, DOI: 10.1056/CTBPdd4661622)   Lipid Panel   Result Value Ref Range    Cholesterol 201 (H) <200 mg/dL    Triglycerides 153 (H) <150 mg/dL    Direct Measure HDL 45 23 - 92 mg/dL    LDL Cholesterol Calculated 125 (H) <=100 mg/dL    Non HDL Cholesterol 156 (H) <130 mg/dL    Patient Fasting > 8hrs? Yes     Narrative    Cholesterol  Desirable:  <200 mg/dL    Triglycerides  Normal:  Less than 150 mg/dL  Borderline High:  150-199 mg/dL  High:  200-499 mg/dL  Very High:  Greater than or equal to 500 mg/dL    Direct Measure HDL  Female:  Greater than or equal to 50 mg/dL   Male:  Greater than or equal to 40 mg/dL    LDL Cholesterol  Desirable:  <100mg/dL  Above Desirable:  100-129 mg/dL   Borderline High:  130-159 mg/dL   High:  160-189 mg/dL   Very High:  >= 190 mg/dL    Non HDL Cholesterol  Desirable:  130 mg/dL  Above Desirable:  130-159 mg/dL  Borderline High:   160-189 mg/dL  High:  190-219 mg/dL  Very High:  Greater than or equal to 220 mg/dL   CBC with platelets and differential   Result Value Ref Range    WBC Count 8.5 4.0 - 11.0 10e3/uL    RBC Count 4.79 3.80 - 5.20 10e6/uL    Hemoglobin 13.6 11.7 - 15.7 g/dL    Hematocrit 42.0 35.0 - 47.0 %    MCV 88 78 - 100 fL    MCH 28.4 26.5 - 33.0 pg    MCHC 32.4 31.5 - 36.5 g/dL    RDW 12.5 10.0 - 15.0 %    Platelet Count 360 150 - 450 10e3/uL    % Neutrophils 60 %    % Lymphocytes 30 %    % Monocytes 7 %    % Eosinophils 2 %    % Basophils 1 %    % Immature Granulocytes 0 %    NRBCs per 100 WBC 0 <1 /100    Absolute Neutrophils 5.1 1.6 - 8.3 10e3/uL    Absolute Lymphocytes 2.5 0.8 - 5.3 10e3/uL    Absolute Monocytes 0.6 0.0 - 1.3 10e3/uL    Absolute Eosinophils 0.2 0.0 - 0.7 10e3/uL    Absolute Basophils 0.1 0.0 - 0.2 10e3/uL    Absolute Immature Granulocytes 0.0 <=0.4 10e3/uL    Absolute NRBCs 0.0 10e3/uL       If you have any questions or concerns, please call the clinic at the number listed above.       Sincerely,      ALVERTO Rowland CNP

## 2022-05-23 ENCOUNTER — HOSPITAL ENCOUNTER (OUTPATIENT)
Dept: MAMMOGRAPHY | Facility: OTHER | Age: 62
Discharge: HOME | End: 2022-05-23
Attending: NURSE PRACTITIONER | Admitting: NURSE PRACTITIONER
Payer: COMMERCIAL

## 2022-05-23 DIAGNOSIS — Z00.00 ROUTINE HISTORY AND PHYSICAL EXAMINATION OF ADULT: ICD-10-CM

## 2022-05-23 DIAGNOSIS — Z12.31 VISIT FOR SCREENING MAMMOGRAM: ICD-10-CM

## 2022-05-23 PROCEDURE — 77067 SCR MAMMO BI INCL CAD: CPT

## 2022-06-16 DIAGNOSIS — E78.2 MIXED HYPERLIPIDEMIA: ICD-10-CM

## 2022-06-16 NOTE — TELEPHONE ENCOUNTER
[id number] [place] sent Rx request for the following:      Requested Prescriptions   Pending Prescriptions Disp Refills     ASPIRIN LOW DOSE 81 MG EC tablet [Pharmacy Med Name: ASPIRIN EC 81 MG TABLET] 90 tablet 0     Sig: TAKE 1 TABLET BY MOUTH EVERY DAY   Last Prescription Date:   1/27/22  Last Fill Qty/Refills:         90, R-0    Last Office Visit:              5/19/22   Future Office visit:           None    Kourtney Mcallister RN .............. 6/16/2022  2:51 PM

## 2022-06-17 RX ORDER — ASPIRIN 81 MG/1
TABLET, COATED ORAL
Qty: 100 TABLET | Refills: 3 | Status: SHIPPED | OUTPATIENT
Start: 2022-06-17

## 2022-09-17 ENCOUNTER — HEALTH MAINTENANCE LETTER (OUTPATIENT)
Age: 62
End: 2022-09-17

## 2023-07-30 ENCOUNTER — HEALTH MAINTENANCE LETTER (OUTPATIENT)
Age: 63
End: 2023-07-30

## 2023-08-06 DIAGNOSIS — F33.42 RECURRENT MAJOR DEPRESSIVE DISORDER, IN FULL REMISSION (H): ICD-10-CM

## 2023-08-08 RX ORDER — PAROXETINE 40 MG/1
40 TABLET, FILM COATED ORAL DAILY
Qty: 30 TABLET | Refills: 0 | Status: SHIPPED | OUTPATIENT
Start: 2023-08-08 | End: 2023-08-31

## 2023-08-08 NOTE — TELEPHONE ENCOUNTER
Last Prescription Date: 5/19/22  Last Qty/Refills: 90 / 4  Last Office Visit: 5/19/22  Future Office Visit: 8/31/23     Patient overdue for physical- was called and explained this and she was in an agreement to schedule appointment. Please advise on pended refill request until appointment    Corinne R Thayer, RN on 8/8/2023 at 9:27 AM    Requested Prescriptions   Pending Prescriptions Disp Refills    PARoxetine (PAXIL) 40 MG tablet [Pharmacy Med Name: PAROXETINE HCL 40 MG TABLET] 90 tablet 2     Sig: TAKE 1 TABLET BY MOUTH EVERY DAY

## 2023-08-31 ENCOUNTER — OFFICE VISIT (OUTPATIENT)
Dept: FAMILY MEDICINE | Facility: OTHER | Age: 63
End: 2023-08-31
Attending: NURSE PRACTITIONER
Payer: COMMERCIAL

## 2023-08-31 VITALS
RESPIRATION RATE: 15 BRPM | SYSTOLIC BLOOD PRESSURE: 134 MMHG | OXYGEN SATURATION: 97 % | WEIGHT: 155.6 LBS | TEMPERATURE: 97.8 F | BODY MASS INDEX: 27.57 KG/M2 | HEART RATE: 72 BPM | HEIGHT: 63 IN | DIASTOLIC BLOOD PRESSURE: 82 MMHG

## 2023-08-31 DIAGNOSIS — Z12.31 ENCOUNTER FOR SCREENING MAMMOGRAM FOR BREAST CANCER: Primary | ICD-10-CM

## 2023-08-31 DIAGNOSIS — R32 URINARY INCONTINENCE, UNSPECIFIED TYPE: ICD-10-CM

## 2023-08-31 DIAGNOSIS — Z00.00 ENCOUNTER FOR MEDICARE ANNUAL WELLNESS EXAM: ICD-10-CM

## 2023-08-31 DIAGNOSIS — F33.42 RECURRENT MAJOR DEPRESSIVE DISORDER, IN FULL REMISSION (H): ICD-10-CM

## 2023-08-31 DIAGNOSIS — Z78.0 MENOPAUSE: ICD-10-CM

## 2023-08-31 PROCEDURE — G0463 HOSPITAL OUTPT CLINIC VISIT: HCPCS

## 2023-08-31 PROCEDURE — G0439 PPPS, SUBSEQ VISIT: HCPCS | Performed by: NURSE PRACTITIONER

## 2023-08-31 PROCEDURE — 99212 OFFICE O/P EST SF 10 MIN: CPT | Mod: 25 | Performed by: NURSE PRACTITIONER

## 2023-08-31 RX ORDER — PAROXETINE 40 MG/1
40 TABLET, FILM COATED ORAL DAILY
Qty: 90 TABLET | Refills: 3 | Status: SHIPPED | OUTPATIENT
Start: 2023-08-31 | End: 2024-02-26

## 2023-08-31 ASSESSMENT — ENCOUNTER SYMPTOMS
DIZZINESS: 0
DYSURIA: 0
MYALGIAS: 0
FREQUENCY: 0
SORE THROAT: 0
COUGH: 0
NAUSEA: 0
EYE PAIN: 0
ARTHRALGIAS: 0
CONSTIPATION: 1
PARESTHESIAS: 0
HEADACHES: 0
BREAST MASS: 0
NERVOUS/ANXIOUS: 1
CHILLS: 0
WEAKNESS: 1
HEMATURIA: 0
ABDOMINAL PAIN: 0
PALPITATIONS: 0
HEMATOCHEZIA: 0
DIARRHEA: 0
SHORTNESS OF BREATH: 0
FEVER: 0
JOINT SWELLING: 0
HEARTBURN: 0

## 2023-08-31 ASSESSMENT — ACTIVITIES OF DAILY LIVING (ADL): CURRENT_FUNCTION: NO ASSISTANCE NEEDED

## 2023-08-31 ASSESSMENT — PATIENT HEALTH QUESTIONNAIRE - PHQ9
10. IF YOU CHECKED OFF ANY PROBLEMS, HOW DIFFICULT HAVE THESE PROBLEMS MADE IT FOR YOU TO DO YOUR WORK, TAKE CARE OF THINGS AT HOME, OR GET ALONG WITH OTHER PEOPLE: SOMEWHAT DIFFICULT
SUM OF ALL RESPONSES TO PHQ QUESTIONS 1-9: 2
SUM OF ALL RESPONSES TO PHQ QUESTIONS 1-9: 2

## 2023-08-31 ASSESSMENT — PAIN SCALES - GENERAL: PAINLEVEL: NO PAIN (0)

## 2023-08-31 NOTE — PROGRESS NOTES
"SUBJECTIVE:   Josie is a 63 year old who presents for Preventive Visit.      8/31/2023     7:43 AM   Additional Questions   Roomed by Demi Z   Accompanied by Self       Are you in the first 12 months of your Medicare coverage?  No    Healthy Habits:     In general, how would you rate your overall health?  Good    Frequency of exercise:  None    Do you usually eat at least 4 servings of fruit and vegetables a day, include whole grains    & fiber and avoid regularly eating high fat or \"junk\" foods?  No    Taking medications regularly:  Yes    Medication side effects:  None    Ability to successfully perform activities of daily living:  No assistance needed    Home Safety:  No safety concerns identified    Hearing Impairment:  Need to ask people to speak up or repeat themselves, find that men's voices are easier to understand than woman's and difficulty understanding soft or whispered speech    In the past 6 months, have you been bothered by leaking of urine? Yes    In general, how would you rate your overall mental or emotional health?  Good    Additional concerns today:  No        Have you ever done Advance Care Planning? (For example, a Health Directive, POLST, or a discussion with a medical provider or your loved ones about your wishes): Yes, patient states has an Advance Care Planning document and will bring a copy to the clinic.       Fall risk  Fallen 2 or more times in the past year?: No  Any fall with injury in the past year?: No    Cognitive Screening   1) Repeat 3 items (Leader, Season, Table)    2) Clock draw: NORMAL  3) 3 item recall: Recalls 3 objects  Results: 3 items recalled: COGNITIVE IMPAIRMENT LESS LIKELY    Mini-CogTM Copyright MICHAEL Pugh. Licensed by the author for use in [facility]; reprinted with permission (korin@.[email or web address]). All rights reserved.      Do you have sleep apnea, excessive snoring or daytime drowsiness? : no    Reviewed and updated as needed this visit by clinical staff   " Tobacco  Allergies  Meds   Med Hx  Surg Hx  Fam Hx  Soc Hx        Reviewed and updated as needed this visit by Provider                 Social History     Tobacco Use    Smoking status: Never    Smokeless tobacco: Never   Substance Use Topics    Alcohol use: No             8/31/2023     7:47 AM   Alcohol Use   Prescreen: >3 drinks/day or >7 drinks/week? No     Do you have a current opioid prescription? No  Do you use any other controlled substances or medications that are not prescribed by a provider? None        She comes in today for annual Medicare wellness.  She is reporting some frequent urinary leakage.  She states this happens various times a day, not necessarily stress or urge related.  She does need to wear a light panty liner, this seems to be worsening.  She does have frequent constipation.  Occasionally she will take medications, if she eats grapes this will help keep her stools regular.      Current providers sharing in care for this patient include:   Patient Care Team:  Lakisha De La Cruz APRN CNP as PCP - General (Nurse Practitioner - Family)  Lakisha De La Cruz APRN CNP as Assigned PCP    The following health maintenance items are reviewed in Epic and correct as of today:  Health Maintenance   Topic Date Due    DEXA  Never done    ZOSTER IMMUNIZATION (1 of 2) Never done    COVID-19 Vaccine (3 - Pfizer series) 06/11/2021    ADVANCE CARE PLANNING  03/26/2023    MEDICARE ANNUAL WELLNESS VISIT  05/26/2023    Pneumococcal Vaccine: 65+ Years (1 - PCV) 05/26/2023    INFLUENZA VACCINE (1) 09/01/2023    PHQ-9  02/29/2024    MAMMO SCREENING  05/23/2024    COLORECTAL CANCER SCREENING  07/24/2024    FALL RISK ASSESSMENT  08/31/2024    LIPID  05/19/2027    DTAP/TDAP/TD IMMUNIZATION (2 - Td or Tdap) 03/26/2028    DEPRESSION ACTION PLAN  Completed    IPV IMMUNIZATION  Aged Out    HPV IMMUNIZATION  Aged Out    MENINGITIS IMMUNIZATION  Aged Out    HEPATITIS C SCREENING  Discontinued    HIV SCREENING   Discontinued     BP Readings from Last 3 Encounters:   23 134/82   22 118/80   20 112/84    Wt Readings from Last 3 Encounters:   23 70.6 kg (155 lb 9.6 oz)   22 72.4 kg (159 lb 9.6 oz)   20 72.7 kg (160 lb 3.2 oz)                  Patient Active Problem List   Diagnosis    Anemia    Depression    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), 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    Hyperlipidemia LDL goal <100    PVC's (premature ventricular contractions)    Mixed hyperlipidemia     Past Surgical History:   Procedure Laterality Date    COLONOSCOPY  2014,10 year follow up    HYSTERECTOMY TOTAL ABDOMINAL      2004    LAPAROSCOPIC CHOLECYSTECTOMY          OTHER SURGICAL HISTORY      3/6/12,EXLOS002,AMPUTATION,revision of left index finger, Dr Cortez       Social History     Tobacco Use    Smoking status: Never    Smokeless tobacco: Never   Substance Use Topics    Alcohol use: No     Family History   Problem Relation Age of Onset    Substance Abuse Mother         Alcohol/Drug,Problems with alcohol abuse    Heart Disease Father         Heart Disease,CAD, MI, did smoke    Diabetes Father         Diabetes    Hypertension Father         Hypertension    Hypertension Sister         Hypertension    Substance Abuse Sister     Bronchitis Sister     Heart Disease Sister     Hypertension Sister         Hypertension    Other - See Comments Other         Psychiatric illness,Depression, probably committed suicide    Breast Cancer Maternal Aunt         Cancer-breast    Cancer Maternal Aunt         Cancer, of pharyngeal cancer age 68    Heart Disease Other         Heart Disease,MI    Other - See Comments Other         smoked    Diabetes Other         Diabetes    Heart Disease Other         Heart Disease,CAD    Heart Disease Other         Heart Disease,CAD    Cancer Other         Cancer,Testicular cancer.    Hypertension Brother     Hypertension Brother      "Hypertension Brother          Current Outpatient Medications   Medication Sig Dispense Refill    ASPIRIN LOW DOSE 81 MG EC tablet TAKE 1 TABLET BY MOUTH EVERY  tablet 3    Multiple Vitamin (MULTI-VITAMINS) TABS Take 1 tablet by mouth daily      PARoxetine (PAXIL) 40 MG tablet Take 1 tablet (40 mg) by mouth daily 90 tablet 3     Allergies   Allergen Reactions    Amoxicillin Rash     Mammogram Screening: Mammogram Screening: Recommended mammography every 1-2 years with patient discussion and risk factor consideration        8/31/2023     7:48 AM   Breast CA Risk Assessment (FHS-7)   Do you have a family history of breast, colon, or ovarian cancer? No / Unknown         Mammogram Screening: Recommended mammography every 1-2 years with patient discussion and risk factor consideration  Pertinent mammograms are reviewed under the imaging tab.    Review of Systems   Constitutional:  Negative for chills and fever.   HENT:  Negative for congestion, ear pain, hearing loss and sore throat.    Eyes:  Negative for pain and visual disturbance.   Respiratory:  Negative for cough and shortness of breath.    Cardiovascular:  Negative for chest pain, palpitations and peripheral edema.   Gastrointestinal:  Positive for constipation. Negative for abdominal pain, diarrhea, heartburn, hematochezia and nausea.   Breasts:  Negative for tenderness, breast mass and discharge.   Genitourinary:  Negative for dysuria, frequency, genital sores, hematuria, pelvic pain, urgency, vaginal bleeding and vaginal discharge.   Musculoskeletal:  Negative for arthralgias, joint swelling and myalgias.   Skin:  Negative for rash.   Neurological:  Positive for weakness. Negative for dizziness, headaches and paresthesias.   Psychiatric/Behavioral:  Positive for mood changes. The patient is nervous/anxious.        OBJECTIVE:   /82   Pulse 72   Temp 97.8  F (36.6  C) (Tympanic)   Resp 15   Ht 1.588 m (5' 2.5\")   Wt 70.6 kg (155 lb 9.6 oz)   " "SpO2 97%   BMI 28.01 kg/m   Estimated body mass index is 28.01 kg/m  as calculated from the following:    Height as of this encounter: 1.588 m (5' 2.5\").    Weight as of this encounter: 70.6 kg (155 lb 9.6 oz).  Physical Exam  GENERAL APPEARANCE: healthy, alert and no distress  EYES: Eyes grossly normal to inspection, PERRL and conjunctivae and sclerae normal  HENT: ear canals and TM's normal, nose and mouth without ulcers or lesions, oropharynx clear and oral mucous membranes moist  NECK: no adenopathy, no asymmetry, masses, or scars and thyroid normal to palpation  RESP: lungs clear to auscultation - no rales, rhonchi or wheezes  CV: regular rate and rhythm, normal S1 S2, no S3 or S4, no murmur, click or rub, no peripheral edema and peripheral pulses strong  ABDOMEN: soft, nontender, no hepatosplenomegaly, no masses and bowel sounds normal  MS: no musculoskeletal defects are noted and gait is age appropriate without ataxia  SKIN: no suspicious lesions or rashes  NEURO: Normal strength and tone, sensory exam grossly normal, mentation intact and speech normal  PSYCH: mentation appears normal and affect normal/bright        ASSESSMENT / PLAN:       ICD-10-CM    1. Encounter for screening mammogram for breast cancer  Z12.31 MA Screen Bilateral w/Aaron      2. Recurrent major depressive disorder, in full remission (H)  F33.42 PARoxetine (PAXIL) 40 MG tablet      3. Urinary incontinence, unspecified type  R32 Physical Therapy Referral      4. Encounter for Medicare annual wellness exam  Z00.00       5. Menopause  Z78.0 DX Hip/Pelvis/Spine      Mammogram and DEXA scan ordered  Up-to-date on colonoscopy  Discussed immunizations including shingles, pneumonia, COVID and flu.  Referral to physical therapy for urinary incontinence  Refilled Paxil x1 year  Follow-up annually and as needed    Patient has been advised of split billing requirements and indicates understanding: Yes      COUNSELING:  Reviewed preventive health " "counseling, as reflected in patient instructions       Regular exercise       Healthy diet/nutrition       Vision screening       Bladder control       Osteoporosis prevention/bone health      BMI:   Estimated body mass index is 28.01 kg/m  as calculated from the following:    Height as of this encounter: 1.588 m (5' 2.5\").    Weight as of this encounter: 70.6 kg (155 lb 9.6 oz).   Weight management plan: Discussed healthy diet and exercise guidelines      She reports that she has never smoked. She has never used smokeless tobacco.      Appropriate preventive services were discussed with this patient, including applicable screening as appropriate for cardiovascular disease, diabetes, osteopenia/osteoporosis, and glaucoma.  As appropriate for age/gender, discussed screening for colorectal cancer, prostate cancer, breast cancer, and cervical cancer. Checklist reviewing preventive services available has been given to the patient.    Reviewed patients plan of care and provided an AVS. The Basic Care Plan (routine screening as documented in Health Maintenance) for Josie meets the Care Plan requirement. This Care Plan has been established and reviewed with the Patient.          [facility]    Identified Health Risks:  I have reviewed Opioid Use Disorder and Substance Use Disorder risk factors and made any needed referrals. Answers submitted by the patient for this visit:  Patient Health Questionnaire (Submitted on 8/31/2023)  If you checked off any problems, how difficult have these problems made it for you to do your work, take care of things at home, or get along with other people?: Somewhat difficult  PHQ9 TOTAL SCORE: 2  She is at risk for lack of exercise and has been provided with information to increase physical activity for the benefit of her well-being.  The patient was counseled and encouraged to consider modifying their diet and eating habits. She was provided " with information on recommended healthy diet options.  The patient was provided with written information regarding signs of hearing loss.  Information on urinary incontinence and treatment options given to patient.

## 2023-08-31 NOTE — NURSING NOTE
"Chief Complaint   Patient presents with    Wellness Visit       Initial /82   Pulse 72   Temp 97.8  F (36.6  C) (Tympanic)   Resp 15   Ht 1.588 m (5' 2.5\")   Wt 70.6 kg (155 lb 9.6 oz)   SpO2 97%   BMI 28.01 kg/m   Estimated body mass index is 28.01 kg/m  as calculated from the following:    Height as of this encounter: 1.588 m (5' 2.5\").    Weight as of this encounter: 70.6 kg (155 lb 9.6 oz).  Medication Reconciliation: complete    FOOD SECURITY SCREENING QUESTIONS  Hunger Vital Signs:  Within the past 12 months we worried whether our food would run out before we got money to buy more. Never  Within the past 12 months the food we bought just didn't last and we didn't have money to get more. Never  Mirtha Stover LPN 8/31/2023 7:53 AM          "

## 2023-08-31 NOTE — PATIENT INSTRUCTIONS
"Calcium 6173-3655 mg a day    Patient Education   Personalized Prevention Plan  You are due for the preventive services outlined below.  Your care team is available to assist you in scheduling these services.  If you have already completed any of these items, please share that information with your care team to update in your medical record.  Health Maintenance Due   Topic Date Due    Osteoporosis Screening  Never done    Zoster (Shingles) Vaccine (1 of 2) Never done    COVID-19 Vaccine (3 - Pfizer series) 06/11/2021    Annual Wellness Visit  05/26/2023    Pneumococcal Vaccine (1 - PCV) 05/26/2023     Learning About Being Physically Active  What is physical activity?     Being physically active means doing any kind of activity that gets your body moving.  The types of physical activity that can help you get fit and stay healthy include:  Aerobic or \"cardio\" activities. These make your heart beat faster and make you breathe harder, such as brisk walking, riding a bike, or running. They strengthen your heart and lungs and build up your endurance.  Strength training activities. These make your muscles work against, or \"resist,\" something. Examples include lifting weights or doing push-ups. These activities help tone and strengthen your muscles and bones.  Stretches. These let you move your joints and muscles through their full range of motion. Stretching helps you be more flexible.  Reaching a balance between these three types of physical activity is important because each one contributes to your overall fitness.  What are the benefits of being active?  Being active is one of the best things you can do for your health. It helps you to:  Feel stronger and have more energy to do all the things you like to do.  Focus better at school or work.  Feel, think, and sleep better.  Reach and stay at a healthy weight.  Lose fat and build lean muscle.  Lower your risk for serious health problems, including diabetes, heart attack, " "high blood pressure, and some cancers.  Keep your heart, lungs, bones, muscles, and joints strong and healthy.  How can you make being active part of your life?  Start slowly. Make it your long-term goal to get at least 30 minutes of exercise on most days of the week. Walking is a good choice. You also may want to do other activities, such as running, swimming, cycling, or playing tennis or team sports.  Pick activities that you like--ones that make your heart beat faster, your muscles stronger, and your muscles and joints more flexible. If you find more than one thing you like doing, do them all. You don't have to do the same thing every day.  Get your heart pumping every day. Any activity that makes your heart beat faster and keeps it at that rate for a while counts.  Here are some great ways to get your heart beating faster:  Go for a brisk walk, run, or bike ride.  Go for a hike or swim.  Go in-line skating.  Play a game of touch football, basketball, or soccer.  Ride a bike.  Play tennis or racquetball.  Climb stairs.  Even some household chores can be aerobic--just do them at a faster pace. Vacuuming, raking or mowing the lawn, sweeping the garage, and washing and waxing the car all can help get your heart rate up.  Strengthen your muscles during the week. You don't have to lift heavy weights or grow big, bulky muscles to get stronger. Doing a few simple activities that make your muscles work against, or \"resist,\" something can help you get stronger.  For example, you can:  Do push-ups or sit-ups, which use your own body weight as resistance.  Lift weights or dumbbells or use stretch bands at home or in a gym or community center.  Stretch your muscles often. Stretching will help you as you become more active. It can help you stay flexible, loosen tight muscles, and avoid injury. It can also help improve your balance and posture and can be a great way to relax.  Be sure to stretch the muscles you'll be using " "when you work out. It's best to warm your muscles slightly before you stretch them. Walk or do some other light aerobic activity for a few minutes, and then start stretching.  When you stretch your muscles:  Do it slowly. Stretching is not about going fast or making sudden movements.  Don't push or bounce during a stretch.  Hold each stretch for at least 15 to 30 seconds, if you can. You should feel a stretch in the muscle, but not pain.  Breathe out as you do the stretch. Then breathe in as you hold the stretch. Don't hold your breath.  If you're worried about how more activity might affect your health, have a checkup before you start. Follow any special advice your doctor gives you for getting a smart start.  Where can you learn more?  Go to https://www.MindFuse.net/patiented  Enter W332 in the search box to learn more about \"Learning About Being Physically Active.\"  Current as of: October 10, 2022               Content Version: 13.7    0721-7551 [employer].   Care instructions adapted under license by your healthcare professional. If you have questions about a medical condition or this instruction, always ask your healthcare professional. [employer] disclaims any warranty or liability for your use of this information.      Learning About Dietary Guidelines  What are the Dietary Guidelines for Americans?     Dietary Guidelines for Americans provide tips for eating well and staying healthy. This helps reduce the risk for long-term (chronic) diseases.  These guidelines recommend that you:  Eat and drink the right amount for you. The U.[place] government's food guide is called MyPlate. It can help you make your own well-balanced eating plan.  Try to balance your eating with your activity. This helps you stay at a healthy weight.  Drink alcohol in moderation, if at all.  Limit foods high in salt, saturated fat, trans fat, and added sugar.  These guidelines are from the [place] Department of " "Agriculture and the U.S. Department of Health and Human Services. They are updated every 5 years.  What is MyPlate?  MyPlate is the [place] government's food guide. It can help you make your own well-balanced eating plan. A balanced eating plan means that you eat enough, but not too much, and that your food gives you the nutrients you need to stay healthy.  MyPlate focuses on eating plenty of whole grains, fruits, and vegetables, and on limiting fat and sugar. It is available online at www.ChooseMyPlate.gov.  How can you get started?  If you're trying to eat healthier, you can slowly change your eating habits over time. You don't have to make big changes all at once. Start by adding one or two healthy foods to your meals each day.  Grains  Choose whole-grain breads and cereals and whole-wheat pasta and whole-grain crackers.  Vegetables  Eat a variety of vegetables every day. They have lots of nutrients and are part of a heart-healthy diet.  Fruits  Eat a variety of fruits every day. Fruits contain lots of nutrients. Choose fresh fruit instead of fruit juice.  Protein foods  Choose fish and lean poultry more often. Eat red meat and fried meats less often. Dried beans, tofu, and nuts are also good sources of protein.  Dairy  Choose low-fat or fat-free products from this food group. If you have problems digesting milk, try eating cheese or yogurt instead.  Fats and oils  Limit fats and oils if you're trying to cut calories. Choose healthy fats when you cook. These include canola oil and olive oil.  Where can you learn more?  Go to https://www.[employer].net/patiented  Enter D676 in the search box to learn more about \"Learning About Dietary Guidelines.\"  Current as of: March 1, 2023               Content Version: 13.7    0093-7180 [employer].   Care instructions adapted under license by your healthcare professional. If you have questions about a medical condition or this instruction, always ask your " healthcare professional. [employer], [facility] disclaims any warranty or liability for your use of this information.      Hearing Loss: Care Instructions  Overview     Hearing loss is a sudden or slow decrease in how well you hear. It can range from slight to profound. Permanent hearing loss can occur with aging. It also can happen when you are exposed long-term to loud noise. Examples include listening to loud music, riding motorcycles, or being around other loud machines.  Hearing loss can affect your work and home life. It can make you feel lonely or depressed. You may feel that you have lost your independence. But hearing aids and other devices can help you hear better and feel connected to others.  Follow-up care is a key part of your treatment and safety. Be sure to make and go to all appointments, and call your doctor if you are having problems. It's also a good idea to know your test results and keep a list of the medicines you take.  How can you care for yourself at home?  Avoid loud noises whenever possible. This helps keep your hearing from getting worse.  Always wear hearing protection around loud noises.  Wear a hearing aid as directed.  A professional can help you pick a hearing aid that will work best for you.  You can also get hearing aids over the counter for mild to moderate hearing loss.  Have hearing tests as your doctor suggests. They can show whether your hearing has changed. Your hearing aid may need to be adjusted.  Use other devices as needed. These may include:  Telephone amplifiers and hearing aids that can connect to a television, stereo, radio, or microphone.  Devices that use lights or vibrations. These alert you to the doorbell, a ringing telephone, or a baby monitor.  Television closed-captioning. This shows the words at the bottom of the screen. Most new TVs can do this.  TTY (text telephone). This lets you type messages back and forth on the telephone instead of talking or  "listening. These devices are also called TDD. When messages are typed on the keyboard, they are sent over the phone line to a receiving TTY. The message is shown on a monitor.  Use text messaging, social media, and email if it is hard for you to communicate by telephone.  Try to learn a listening technique called speechreading. It is not lipreading. You pay attention to people's gestures, expressions, posture, and tone of voice. These clues can help you understand what a person is saying. Face the person you are talking to, and have them face you. Make sure the lighting is good. You need to see the other person's face clearly.  Think about counseling if you need help to adjust to your hearing loss.  When should you call for help?  Watch closely for changes in your health, and be sure to contact your doctor if:    You think your hearing is getting worse.     You have new symptoms, such as dizziness or nausea.   Where can you learn more?  Go to https://www.[employer].net/patiented  Enter R798 in the search box to learn more about \"Hearing Loss: Care Instructions.\"  Current as of: March 1, 2023               Content Version: 13.7    2416-8879 [employer].   Care instructions adapted under license by your healthcare professional. If you have questions about a medical condition or this instruction, always ask your healthcare professional. [employer] disclaims any warranty or liability for your use of this information.      Bladder Training: Care Instructions  Your Care Instructions     Bladder training is used to treat urge incontinence and stress incontinence. Urge incontinence means that the need to urinate comes on so fast that you can't get to a toilet in time. Stress incontinence means that you leak urine because of pressure on your bladder. For example, it may happen when you laugh, cough, or lift something heavy.  Bladder training can increase how long you can wait before you have to " urinate. It can also help your bladder hold more urine. And it can give you better control over the urge to urinate.  It is important to remember that bladder training takes a few weeks to a few months to make a difference. You may not see results right away, but don't give up.  Follow-up care is a key part of your treatment and safety. Be sure to make and go to all appointments, and call your doctor if you are having problems. It's also a good idea to know your test results and keep a list of the medicines you take.  How can you care for yourself at home?  Work with your doctor to come up with a bladder training program that is right for you. You may use one or more of the following methods.  Delayed urination  In the beginning, try to keep from urinating for 5 minutes after you first feel the need to go.  While you wait, take deep, slow breaths to relax. Kegel exercises can also help you delay the need to go to the bathroom.  After some practice, when you can easily wait 5 minutes to urinate, try to wait 10 minutes before you urinate.  Slowly increase the waiting period until you are able to control when you have to urinate.  Scheduled urination  Empty your bladder when you first wake up in the morning.  Schedule times throughout the day when you will urinate.  Start by going to the bathroom every hour, even if you don't need to go.  Slowly increase the time between trips to the bathroom.  When you have found a schedule that works well for you, keep doing it.  If you wake up during the night and have to urinate, do it. Apply your schedule to waking hours only.  Kegel exercises  These tighten and strengthen pelvic muscles, which can help you control the flow of urine. (If doing these exercises causes pain, stop doing them and talk with your doctor.) To do Kegel exercises:  Squeeze your muscles as if you were trying not to pass gas. Or squeeze your muscles as if you were stopping the flow of urine. Your belly, legs,  "and buttocks shouldn't move.  Hold the squeeze for 3 seconds, then relax for 5 to 10 seconds.  Start with 3 seconds, then add 1 second each week until you are able to squeeze for 10 seconds.  Repeat the exercise 10 times a session. Do 3 to 8 sessions a day.  When should you call for help?  Watch closely for changes in your health, and be sure to contact your doctor if:    Your incontinence is getting worse.     You do not get better as expected.   Where can you learn more?  Go to https://www.ticketstreet.net/patiented  Enter V684 in the search box to learn more about \"Bladder Training: Care Instructions.\"  Current as of: March 1, 2023               Content Version: 13.7    6053-5158 [employer].   Care instructions adapted under license by your healthcare professional. If you have questions about a medical condition or this instruction, always ask your healthcare professional. [employer], [employer] disclaims any warranty or liability for your use of this information.         "

## 2023-10-05 ENCOUNTER — HOSPITAL ENCOUNTER (OUTPATIENT)
Dept: MAMMOGRAPHY | Facility: OTHER | Age: 63
Discharge: HOME | End: 2023-10-05
Attending: NURSE PRACTITIONER | Admitting: NURSE PRACTITIONER
Payer: MEDICARE

## 2023-10-05 DIAGNOSIS — Z12.31 ENCOUNTER FOR SCREENING MAMMOGRAM FOR BREAST CANCER: ICD-10-CM

## 2023-10-05 PROCEDURE — 77067 SCR MAMMO BI INCL CAD: CPT

## 2023-10-11 ENCOUNTER — HOSPITAL ENCOUNTER (OUTPATIENT)
Dept: BONE DENSITY | Facility: OTHER | Age: 63
Discharge: HOME | End: 2023-10-11
Attending: NURSE PRACTITIONER | Admitting: NURSE PRACTITIONER
Payer: MEDICARE

## 2023-10-11 DIAGNOSIS — Z78.0 MENOPAUSE: ICD-10-CM

## 2023-10-11 PROCEDURE — 77080 DXA BONE DENSITY AXIAL: CPT

## 2023-10-12 ENCOUNTER — HOSPITAL ENCOUNTER (OUTPATIENT)
Dept: ULTRASOUND IMAGING | Facility: OTHER | Age: 63
Discharge: HOME | End: 2023-10-12
Attending: NURSE PRACTITIONER
Payer: MEDICARE

## 2023-10-12 ENCOUNTER — HOSPITAL ENCOUNTER (OUTPATIENT)
Dept: MAMMOGRAPHY | Facility: OTHER | Age: 63
Discharge: HOME | End: 2023-10-12
Attending: NURSE PRACTITIONER
Payer: MEDICARE

## 2023-10-12 ENCOUNTER — THERAPY VISIT (OUTPATIENT)
Dept: PHYSICAL THERAPY | Facility: OTHER | Age: 63
End: 2023-10-12
Attending: NURSE PRACTITIONER
Payer: MEDICARE

## 2023-10-12 DIAGNOSIS — R92.8 ABNORMAL FINDING ON BREAST IMAGING: ICD-10-CM

## 2023-10-12 DIAGNOSIS — R32 URINARY INCONTINENCE, UNSPECIFIED TYPE: ICD-10-CM

## 2023-10-12 PROCEDURE — 97161 PT EVAL LOW COMPLEX 20 MIN: CPT | Mod: GP

## 2023-10-12 PROCEDURE — 76642 ULTRASOUND BREAST LIMITED: CPT | Mod: RT

## 2023-10-12 PROCEDURE — 77065 DX MAMMO INCL CAD UNI: CPT | Mod: RT

## 2023-10-12 PROCEDURE — 77061 BREAST TOMOSYNTHESIS UNI: CPT | Mod: RT

## 2023-10-12 PROCEDURE — 97140 MANUAL THERAPY 1/> REGIONS: CPT | Mod: GP

## 2023-10-12 PROCEDURE — 97110 THERAPEUTIC EXERCISES: CPT | Mod: GP

## 2023-10-12 NOTE — PROGRESS NOTES
PHYSICAL THERAPY EVALUATION  Type of Visit: Evaluation    See electronic medical record for Abuse and Falls Screening details.    Subjective       Presenting condition or subjective complaint:  Patient referred to PT due to chronic but worsening stress incontinence of urine with coughing, lifting, sneezing, laughing. Also deals with chronic constipation. No pain issues. Wants to avoid surgery in the future. Leakage occurs 100% of the time with laughing, coughing, straining. Small to large amount. 5 children, all vagial births. Largest was 10 pounds 7 ounces, two at 10 pounds, one at 9 and one at 8 pounds 7 ounces. Drinking water and eating grapes helps with constipation.   Date of onset: 08/31/23    Dates & types of surgery:   hysterectomy, ovaries remain      Prior therapy history for the same diagnosis, illness or injury:    none    Prior Level of Function  Transfers: Independent  Ambulation: Independent  ADL: Independent    Patient goals for therapy:  reduce leakage, avoid surgery    Pain assessment: Pain denied     Objective      PELVIC EVALUATION  ADDITIONAL HISTORY:    Bladder History:  Feels bladder filling:  yes  Triggers for feeling of inability to wait to go to the bathroom:    no  How long can you wait to urinate:  not an issue but goes frequently to reduce leakage  Gets up at night to urinate:    no  Can stop the flow of urine when urinating:  yes  Volume of urine usually released:   varies  Number of bladder infections in last 12 months:  zero  Fluid intake per day:      8 glasses of water  Medications taken for bladder:     none  Activities causing urine leak:    cough, sneeze, laugh, lifting  Amount of urine typically leaked:  varies  Pads used to help with leaking:      yes    Bowel History:  Frequency of bowel movement:  varies, drinking more water helps with constipation             Discussed reason for referral regarding pelvic health needs and external/internal pelvic floor muscle examination  with patient/guardian.  Opportunity provided to ask questions and verbal consent for assessment and intervention was given.    POSTURE: WNL  LUMBAR SCREEN: AROM WNL  HIP SCREEN:  Strength: WNL     PELVIC/SI SCREEN:  WNL     PELVIC EXAM  External Visual Inspection:  With voluntary pelvic floor contraction: Perineal elevation  Relaxation of PFM: Yes  With intra-abdominal pressure: Bearing down as defecation: Perineal descent    Integumentary:   Introitus: Unremarkable  Anal: Hemorrhoids    External Digital Palpation per Perineum: no tenderness    Internal Digital Palpation:  Per Vagina:  Tone: normal  Digital Muscle Performance: P (Power): 5/5  E (Endurance): 2  R (Repetitions): 8  F (Fast Twitch): 4  Absent cough reflex  Compensations: Gluts       Pelvic Organ Prolapse:   Anterior: At the level of the hymen      Fascial Tension/Restriction/Tone:  restricted middle pubovesical ligament    Assessment & Plan   CLINICAL IMPRESSIONS  Medical Diagnosis: R32 (ICD-10-CM) - Urinary incontinence, unspecified type    Treatment Diagnosis: muscle weakness, stress incontinence of urine   Impression/Assessment: Patient is a 63 year old female with stress incontinence of urine complaints.  The following significant findings have been identified: Decreased strength and Impaired muscle performance. These impairments interfere with their ability to perform self care tasks, work tasks, recreational activities, household chores, household mobility, and community mobility as compared to previous level of function.     Clinical Decision Making (Complexity):  Clinical Presentation: Stable/Uncomplicated  Clinical Presentation Rationale: based on medical and personal factors listed in PT evaluation  Clinical Decision Making (Complexity): Low complexity    PLAN OF CARE  Treatment Interventions:  Modalities: Biofeedback  Interventions: Manual Therapy, Neuromuscular Re-education, Therapeutic Activity, Therapeutic Exercise, Self-Care/Home  Management    Long Term Goals     PT Goal 1  Goal Identifier: MMT  Goal Description: Patient to have MMT 5/5 with endurance of ten seconds to reduce leakage during lifting, coughing.  Target Date: 04/09/24  PT Goal 2  Goal Identifier: muscle use  Goal Description: Patient to correctly engage PFM during lifting, position changes to reduce urinary leakage by 50%.  Target Date: 04/09/24      Frequency of Treatment: 1-2 times per week  Duration of Treatment: 3-6 months    Recommended Referrals to Other Professionals:  NA  Education Assessment:   Learner/Method: Patient;No Barriers to Learning    Risks and benefits of evaluation/treatment have been explained.   Patient/Family/caregiver agrees with Plan of Care.     Evaluation Time:     PT Eval, Low Complexity Minutes (22890): 20    Signing Clinician: Marcia Rivera PT      [facility]                                                                                   OUTPATIENT PHYSICAL THERAPY      PLAN OF TREATMENT FOR OUTPATIENT REHABILITATION   Patient's Last Name, First Name, DEANNEJesusitaNARCISOJesusita  KatieJosie  DEANEN YOB: 1960   Provider's Name   [facility]   Medical Record No.  [id number]     Onset Date: 08/31/23  Start of Care Date: 10/12/23     Medical Diagnosis:  R32 (ICD-10-CM) - Urinary incontinence, unspecified type      PT Treatment Diagnosis:  muscle weakness, stress incontinence of urine Plan of Treatment  Frequency/Duration: 1-2 times per week/ 3-6 months    Certification date from 10/12/23 to 01/09/24         See note for plan of treatment details and functional goals     Marcia Rivera, PT                         I CERTIFY THE NEED FOR THESE SERVICES FURNISHED UNDER        THIS PLAN OF TREATMENT AND WHILE UNDER MY CARE     (Physician attestation of this document indicates review and certification of the therapy plan).                Referring Provider:  Lakisha Eubanks  Assessment  See Epic Evaluation- Start of Care Date: 10/12/23

## 2023-10-17 ENCOUNTER — THERAPY VISIT (OUTPATIENT)
Dept: PHYSICAL THERAPY | Facility: OTHER | Age: 63
End: 2023-10-17
Attending: NURSE PRACTITIONER
Payer: MEDICARE

## 2023-10-17 DIAGNOSIS — R32 URINARY INCONTINENCE, UNSPECIFIED TYPE: Primary | ICD-10-CM

## 2023-10-17 PROCEDURE — 97110 THERAPEUTIC EXERCISES: CPT | Mod: GP

## 2023-10-24 ENCOUNTER — THERAPY VISIT (OUTPATIENT)
Dept: PHYSICAL THERAPY | Facility: OTHER | Age: 63
End: 2023-10-24
Attending: NURSE PRACTITIONER
Payer: MEDICARE

## 2023-10-24 DIAGNOSIS — R32 URINARY INCONTINENCE, UNSPECIFIED TYPE: Primary | ICD-10-CM

## 2023-10-24 PROCEDURE — 97110 THERAPEUTIC EXERCISES: CPT | Mod: GP

## 2023-10-31 ENCOUNTER — THERAPY VISIT (OUTPATIENT)
Dept: PHYSICAL THERAPY | Facility: OTHER | Age: 63
End: 2023-10-31
Attending: NURSE PRACTITIONER
Payer: MEDICARE

## 2023-10-31 DIAGNOSIS — R32 URINARY INCONTINENCE, UNSPECIFIED TYPE: Primary | ICD-10-CM

## 2023-10-31 PROCEDURE — 97110 THERAPEUTIC EXERCISES: CPT | Mod: GP

## 2023-11-07 ENCOUNTER — OFFICE VISIT (OUTPATIENT)
Dept: FAMILY MEDICINE | Facility: OTHER | Age: 63
End: 2023-11-07
Attending: NURSE PRACTITIONER
Payer: MEDICARE

## 2023-11-07 ENCOUNTER — THERAPY VISIT (OUTPATIENT)
Dept: PHYSICAL THERAPY | Facility: OTHER | Age: 63
End: 2023-11-07
Attending: NURSE PRACTITIONER
Payer: MEDICARE

## 2023-11-07 VITALS
HEART RATE: 77 BPM | BODY MASS INDEX: 27.43 KG/M2 | HEIGHT: 63 IN | SYSTOLIC BLOOD PRESSURE: 122 MMHG | WEIGHT: 154.8 LBS | DIASTOLIC BLOOD PRESSURE: 78 MMHG | OXYGEN SATURATION: 97 % | TEMPERATURE: 97.4 F | RESPIRATION RATE: 16 BRPM

## 2023-11-07 DIAGNOSIS — R20.0 NUMBNESS AND TINGLING IN RIGHT HAND: Primary | ICD-10-CM

## 2023-11-07 DIAGNOSIS — R20.2 NUMBNESS AND TINGLING IN RIGHT HAND: Primary | ICD-10-CM

## 2023-11-07 DIAGNOSIS — R32 URINARY INCONTINENCE, UNSPECIFIED TYPE: Primary | ICD-10-CM

## 2023-11-07 PROCEDURE — 97110 THERAPEUTIC EXERCISES: CPT | Mod: GP

## 2023-11-07 PROCEDURE — G0463 HOSPITAL OUTPT CLINIC VISIT: HCPCS

## 2023-11-07 PROCEDURE — 99213 OFFICE O/P EST LOW 20 MIN: CPT | Performed by: NURSE PRACTITIONER

## 2023-11-07 ASSESSMENT — PATIENT HEALTH QUESTIONNAIRE - PHQ9
SUM OF ALL RESPONSES TO PHQ QUESTIONS 1-9: 1
SUM OF ALL RESPONSES TO PHQ QUESTIONS 1-9: 1
10. IF YOU CHECKED OFF ANY PROBLEMS, HOW DIFFICULT HAVE THESE PROBLEMS MADE IT FOR YOU TO DO YOUR WORK, TAKE CARE OF THINGS AT HOME, OR GET ALONG WITH OTHER PEOPLE: NOT DIFFICULT AT ALL

## 2023-11-07 ASSESSMENT — ENCOUNTER SYMPTOMS: NUMBNESS: 1

## 2023-11-07 ASSESSMENT — PAIN SCALES - GENERAL: PAINLEVEL: NO PAIN (0)

## 2023-11-07 NOTE — NURSING NOTE
"Chief Complaint   Patient presents with    Shoulder     Right Shoulder     Numbness     In Fingers/Right Hand        Initial /78 (BP Location: Right arm, Patient Position: Chair, Cuff Size: Adult Regular)   Pulse 77   Temp 97.4  F (36.3  C) (Temporal)   Resp 16   Ht 1.588 m (5' 2.5\")   Wt 70.2 kg (154 lb 12.8 oz)   SpO2 97%   BMI 27.86 kg/m   Estimated body mass index is 27.86 kg/m  as calculated from the following:    Height as of this encounter: 1.588 m (5' 2.5\").    Weight as of this encounter: 70.2 kg (154 lb 12.8 oz).    FOOD SECURITY SCREENING QUESTIONS:    The next two questions are to help us understand your food security.  If you are feeling you need any assistance in this area, we have resources available to support you today.    Hunger Vital Signs:  Within the past 12 months we worried whether our food would run out before we got money to buy more. Never  Within the past 12 months the food we bought just didn't last and we didn't have money to get more. Never    Medication Reconciliation: Complete.       Natasha Pierce LPN on 11/7/2023 at 2:27 PM     "

## 2023-11-07 NOTE — PROGRESS NOTES
Assessment & Plan   Problem List Items Addressed This Visit    None  Visit Diagnoses       Numbness and tingling in right hand    -  Primary    Relevant Orders    Adult Neurology  Referral        Numbness and tingling right hand, concern for carpal tunnel especially in light of her previous career.  Referral to neurology for EMG placed.  We will follow-up with results and treat accordingly.    Ordering of each unique test             No follow-ups on file.    ALVERTO Haines CNP  [facility]    Subjective   Carrie is a 63 year old, presenting for the following health issues:  Shoulder (Right Shoulder ) and Numbness (In Fingers/Right Hand )      11/7/2023     2:24 PM   Additional Questions   Roomed by Natasha CESAR LPN   Accompanied by n/a       History of Present Illness       Reason for visit:  Pinched nerve numb fingers  Symptom intensity:  Moderate  Symptom progression:  Staying the same  Had these symptoms before:  Yes  Has tried/received treatment for these symptoms:  No  What makes it worse:  No  What makes it better:  Ice and heat sometimes    She eats 2-3 servings of fruits and vegetables daily.She consumes 2 sweetened beverage(s) daily.She exercises with enough effort to increase her heart rate 10 to 19 minutes per day.  She exercises with enough effort to increase her heart rate 3 or less days per week.   She is taking medications regularly.     She comes in the clinic today for evaluation of right hand issues.  She is having numbness in her fingers, the middle is constantly numb, index and fourth fingers are intermittent.  This been going on for approximately 3 months.  She feels like she has an impingement into her back or shoulder, she has done massage in the past which is helpful.  She is not having any neck pain.  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  She has done bracing in the past without significant  "change in symptoms.  She is right-handed.      Review of Systems   Neurological:  Positive for numbness.   See above         Objective    /78 (BP Location: Right arm, Patient Position: Chair, Cuff Size: Adult Regular)   Pulse 77   Temp 97.4  F (36.3  C) (Temporal)   Resp 16   Ht 1.588 m (5' 2.5\")   Wt 70.2 kg (154 lb 12.8 oz)   SpO2 97%   BMI 27.86 kg/m    Body mass index is 27.86 kg/m .  Physical Exam   GENERAL: healthy, alert and no distress  MS: Normal range of motion of bilateral wrists.  No pain with palpation.  Normal strength.  No thenar wasting.  Negative Phalen's and Tinel.  SKIN: no suspicious lesions or rashes  NEURO: Normal strength and tone, mentation intact and speech normal  PSYCH: mentation appears normal, affect normal/bright                      "

## 2023-11-14 ENCOUNTER — THERAPY VISIT (OUTPATIENT)
Dept: PHYSICAL THERAPY | Facility: OTHER | Age: 63
End: 2023-11-14
Attending: NURSE PRACTITIONER
Payer: MEDICARE

## 2023-11-14 DIAGNOSIS — R32 URINARY INCONTINENCE, UNSPECIFIED TYPE: Primary | ICD-10-CM

## 2023-11-14 PROCEDURE — 97110 THERAPEUTIC EXERCISES: CPT | Mod: GP

## 2023-11-15 ENCOUNTER — OFFICE VISIT (OUTPATIENT)
Dept: NEUROLOGY | Facility: OTHER | Age: 63
End: 2023-11-15
Attending: PSYCHIATRY & NEUROLOGY
Payer: MEDICARE

## 2023-11-15 VITALS
OXYGEN SATURATION: 97 % | BODY MASS INDEX: 27.46 KG/M2 | HEIGHT: 63 IN | SYSTOLIC BLOOD PRESSURE: 136 MMHG | TEMPERATURE: 98.2 F | DIASTOLIC BLOOD PRESSURE: 86 MMHG | HEART RATE: 75 BPM | RESPIRATION RATE: 18 BRPM | WEIGHT: 155 LBS

## 2023-11-15 DIAGNOSIS — G56.01 CARPAL TUNNEL SYNDROME OF RIGHT WRIST: Primary | ICD-10-CM

## 2023-11-15 PROCEDURE — 95911 NRV CNDJ TEST 9-10 STUDIES: CPT | Mod: 26 | Performed by: PSYCHIATRY & NEUROLOGY

## 2023-11-15 PROCEDURE — 95886 MUSC TEST DONE W/N TEST COMP: CPT | Mod: 26 | Performed by: PSYCHIATRY & NEUROLOGY

## 2023-11-15 PROCEDURE — 99203 OFFICE O/P NEW LOW 30 MIN: CPT | Mod: 25 | Performed by: PSYCHIATRY & NEUROLOGY

## 2023-11-15 PROCEDURE — 95911 NRV CNDJ TEST 9-10 STUDIES: CPT | Performed by: PSYCHIATRY & NEUROLOGY

## 2023-11-15 PROCEDURE — G0463 HOSPITAL OUTPT CLINIC VISIT: HCPCS

## 2023-11-15 PROCEDURE — 95886 MUSC TEST DONE W/N TEST COMP: CPT | Performed by: PSYCHIATRY & NEUROLOGY

## 2023-11-15 ASSESSMENT — PAIN SCALES - GENERAL: PAINLEVEL: NO PAIN (1)

## 2023-11-15 NOTE — NURSING NOTE
Patient is here for numbness and tingling in RUE.     Dorcas Tang LPN .............11/15/2023     9:26 AM

## 2023-11-15 NOTE — PROGRESS NOTES
Referred by Lakisha De La Cruz CNP    History: The patient describes 2 to 3-month history of variable sensory loss and paresthesia affecting the second through fourth digits of the right hand.  There has been no obvious injury or precipitating event.  She does not appreciate any loss of strength.  She has had some proximal upper extremity pain for much longer period of time.  She is asymptomatic on the left.  She is right-handed.    Past medical history: Patient has generally been healthy.  She has hyperlipidemia and there is a past history of major depression.    Review of systems: 10 system review of systems is negative.    Physical examination: The patient is 62 inches tall and weighs 155 pounds.  Blood pressure is 136/76.  Strength is severely reduced on the right for the abductors of the thumb.  There is normal symmetric strength for the interossei, finger extensors and flexors, biceps and triceps.  Reflexes are 1/4 throughout in the upper extremities.  Gait is normal.    Nerve conduction studies motor nerve conduction testing was performed for the right median and ulnar nerves.  The ulnar study was normal but there was very severe conduction block and slowing at the wrist for the median nerve.  H reflex responses were normal.    Orthodromic mixed conduction studies were performed for the right median and ulnar nerves.  Antidromic sensory nerve conduction studies were performed for the second digital, fifth digital and radial nerves.  Waveforms were absent for the median and second digital nerves.  For the other tested nerves the latencies, amplitudes and conduction velocities were normal.    Monopolar EMG needle examination: EMG was performed for the right first dorsal interosseous, extensor digitorum commonness, flexor carpi radialis, triceps, and brachioradialis.  All of the tested muscles showed normal insertional activity.  Motor units were normal in size with normal recruitment and interference.    Impression  the patient has very severe right-sided carpal tunnel syndrome.  Surgical intervention is warranted.  Other than carpal tunnel syndrome she is neurologically healthy.    Findings were reviewed with the patient.

## 2023-11-15 NOTE — LETTER
11/15/2023         RE: Josie Wilson  [address] [address] [address]        Dear Colleague,    Thank you for referring your patient, Josie Wilson, to the [facility]. Please see a copy of my visit note below.    Referred by Lakisha De La Cruz CNP    History: The patient describes 2 to 3-month history of variable sensory loss and paresthesia affecting the second through fourth digits of the right hand.  There has been no obvious injury or precipitating event.  She does not appreciate any loss of strength.  She has had some proximal upper extremity pain for much longer period of time.  She is asymptomatic on the left.  She is right-handed.    Past medical history: Patient has generally been healthy.  She has hyperlipidemia and there is a past history of major depression.    Review of systems: 10 system review of systems is negative.    Physical examination: The patient is 62 inches tall and weighs 155 pounds.  Blood pressure is 136/76.  Strength is severely reduced on the right for the abductors of the thumb.  There is normal symmetric strength for the interossei, finger extensors and flexors, biceps and triceps.  Reflexes are 1/4 throughout in the upper extremities.  Gait is normal.    Nerve conduction studies motor nerve conduction testing was performed for the right median and ulnar nerves.  The ulnar study was normal but there was very severe conduction block and slowing at the wrist for the median nerve.  H reflex responses were normal.    Orthodromic mixed conduction studies were performed for the right median and ulnar nerves.  Antidromic sensory nerve conduction studies were performed for the second digital, fifth digital and radial nerves.  Waveforms were absent for the median and second digital nerves.  For the other tested nerves the latencies, amplitudes and conduction velocities were normal.    Monopolar EMG needle examination: EMG was performed for the right first  dorsal interosseous, extensor digitorum commonness, flexor carpi radialis, triceps, and brachioradialis.  All of the tested muscles showed normal insertional activity.  Motor units were normal in size with normal recruitment and interference.    Impression the patient has very severe right-sided carpal tunnel syndrome.  Surgical intervention is warranted.  Other than carpal tunnel syndrome she is neurologically healthy.    Findings were reviewed with the patient.    Again, thank you for allowing me to participate in the care of your patient.        Sincerely,        Jam Alvarez MD

## 2024-01-15 ENCOUNTER — HOSPITAL ENCOUNTER (OUTPATIENT)
Dept: GENERAL RADIOLOGY | Facility: OTHER | Age: 64
Discharge: HOME | End: 2024-01-15
Attending: NURSE PRACTITIONER
Payer: MEDICARE

## 2024-01-15 ENCOUNTER — OFFICE VISIT (OUTPATIENT)
Dept: FAMILY MEDICINE | Facility: OTHER | Age: 64
End: 2024-01-15
Attending: NURSE PRACTITIONER
Payer: MEDICARE

## 2024-01-15 VITALS
RESPIRATION RATE: 16 BRPM | HEIGHT: 63 IN | TEMPERATURE: 97.7 F | DIASTOLIC BLOOD PRESSURE: 88 MMHG | OXYGEN SATURATION: 96 % | BODY MASS INDEX: 27.36 KG/M2 | HEART RATE: 92 BPM | WEIGHT: 154.4 LBS | SYSTOLIC BLOOD PRESSURE: 136 MMHG

## 2024-01-15 DIAGNOSIS — F33.42 RECURRENT MAJOR DEPRESSIVE DISORDER, IN FULL REMISSION (H): Primary | ICD-10-CM

## 2024-01-15 DIAGNOSIS — M70.61 TROCHANTERIC BURSITIS OF RIGHT HIP: ICD-10-CM

## 2024-01-15 DIAGNOSIS — F41.9 ANXIETY: ICD-10-CM

## 2024-01-15 DIAGNOSIS — M15.0 PRIMARY OSTEOARTHRITIS INVOLVING MULTIPLE JOINTS: ICD-10-CM

## 2024-01-15 PROCEDURE — 73502 X-RAY EXAM HIP UNI 2-3 VIEWS: CPT

## 2024-01-15 PROCEDURE — G0463 HOSPITAL OUTPT CLINIC VISIT: HCPCS

## 2024-01-15 PROCEDURE — G0463 HOSPITAL OUTPT CLINIC VISIT: HCPCS | Mod: 25

## 2024-01-15 PROCEDURE — 99214 OFFICE O/P EST MOD 30 MIN: CPT | Performed by: NURSE PRACTITIONER

## 2024-01-15 RX ORDER — FLUOXETINE 10 MG/1
10 CAPSULE ORAL DAILY
Qty: 7 CAPSULE | Refills: 0 | Status: SHIPPED | OUTPATIENT
Start: 2024-01-15 | End: 2024-02-26

## 2024-01-15 ASSESSMENT — ANXIETY QUESTIONNAIRES
6. BECOMING EASILY ANNOYED OR IRRITABLE: SEVERAL DAYS
7. FEELING AFRAID AS IF SOMETHING AWFUL MIGHT HAPPEN: NOT AT ALL
GAD7 TOTAL SCORE: 6
IF YOU CHECKED OFF ANY PROBLEMS ON THIS QUESTIONNAIRE, HOW DIFFICULT HAVE THESE PROBLEMS MADE IT FOR YOU TO DO YOUR WORK, TAKE CARE OF THINGS AT HOME, OR GET ALONG WITH OTHER PEOPLE: SOMEWHAT DIFFICULT
8. IF YOU CHECKED OFF ANY PROBLEMS, HOW DIFFICULT HAVE THESE MADE IT FOR YOU TO DO YOUR WORK, TAKE CARE OF THINGS AT HOME, OR GET ALONG WITH OTHER PEOPLE?: SOMEWHAT DIFFICULT
2. NOT BEING ABLE TO STOP OR CONTROL WORRYING: NOT AT ALL
1. FEELING NERVOUS, ANXIOUS, OR ON EDGE: SEVERAL DAYS
7. FEELING AFRAID AS IF SOMETHING AWFUL MIGHT HAPPEN: NOT AT ALL
GAD7 TOTAL SCORE: 6
5. BEING SO RESTLESS THAT IT IS HARD TO SIT STILL: SEVERAL DAYS
GAD7 TOTAL SCORE: 6
4. TROUBLE RELAXING: MORE THAN HALF THE DAYS
3. WORRYING TOO MUCH ABOUT DIFFERENT THINGS: SEVERAL DAYS

## 2024-01-15 ASSESSMENT — PATIENT HEALTH QUESTIONNAIRE - PHQ9
10. IF YOU CHECKED OFF ANY PROBLEMS, HOW DIFFICULT HAVE THESE PROBLEMS MADE IT FOR YOU TO DO YOUR WORK, TAKE CARE OF THINGS AT HOME, OR GET ALONG WITH OTHER PEOPLE: NOT DIFFICULT AT ALL
SUM OF ALL RESPONSES TO PHQ QUESTIONS 1-9: 2
SUM OF ALL RESPONSES TO PHQ QUESTIONS 1-9: 2

## 2024-01-15 ASSESSMENT — ENCOUNTER SYMPTOMS: LEG PAIN: 1

## 2024-01-15 ASSESSMENT — PAIN SCALES - GENERAL: PAINLEVEL: MILD PAIN (3)

## 2024-01-15 NOTE — NURSING NOTE
Patient presents today for right leg and bilateral knee pain.    Medication Reconciliation Complete    Emeli Martinez LPN  1/15/2024 3:33 PM
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
## 2024-01-15 NOTE — PATIENT INSTRUCTIONS
Pain management: Tylenol osteoarthritis 2 tablets twice daily or Naproxen 1 tablet twice daily    Referred to physical therapy (shoulder/hip/knees) and sports medicine (shoulder)    Reduce paxil to 10 mg daily for 1 week then stop and start Fluoxetine 10 mg daily for 1 week then 20 mg daily    Follow up regarding anxiety in 4-6 weeks

## 2024-01-22 NOTE — PROGRESS NOTES
DISCHARGE  Reason for Discharge: Patient has met all goals.    Equipment Issued: NA    Discharge Plan: Patient to continue home program.    Referring Provider:  Lakisha De La Cruz         11/14/23 0500   Appointment Info   Signing clinician's name / credentials Marcia Rivera, PT, CLT, CAPP   Visits Used 6   Medical Diagnosis R32 (ICD-10-CM) - Urinary incontinence, unspecified type   PT Tx Diagnosis muscle weakness, stress incontinence of urine   Quick Adds Certification   Progress Note/Certification   Start of Care Date 10/12/23   Onset of illness/injury or Date of Surgery 08/31/23   Therapy Frequency 1-2 times per week   Predicted Duration 3-6 months   Certification date from 10/12/23   Certification date to 01/09/24   GOALS   PT Goals 2;3   PT Goal 1   Goal Identifier MMT   Goal Description Patient to have MMT 5/5 with endurance of ten seconds to reduce leakage during lifting, coughing.   Target Date 04/09/24   Date Met 11/14/23   PT Goal 2   Goal Identifier muscle use   Goal Description Patient to correctly engage PFM during lifting, position changes to reduce urinary leakage by 50%.   Target Date 04/09/24   Date Met 11/14/23   Subjective Report   Subjective Report completed  bladder diary, voiding 8-9 times per day. no leakage with cough/sneeze/laugh   Objective Measures   Objective Measures Objective Measure 1   Objective Measure 1   Objective Measure PERFECT   Treatment Interventions (PT)   Interventions Therapeutic Procedure/Exercise;Manual Therapy   Therapeutic Procedure/Exercise   Therapeutic Procedures: strength, endurance, ROM, flexibillity minutes (64642) 30   Ther Proc 1 strengthening, education   Ther Proc 1 - Details education on continued HEP- more water intake, use of PFM with lifting, urgency control. Practiced PFM use with lifting weight ball, no leakage noted.   Patient Response/Progress understanding of plan   Manual Therapy   Manual Therapy 1 MFR, organ specific fascial mobilization (OSFM)    Education   Learner/Method Patient;No Barriers to Learning   Plan   Home program PFM strengthening- 5x5x5, sit<>stand, various stances   Plan for next session will keep chart open 2 months.   Total Session Time   Timed Code Treatment Minutes 30   Total Treatment Time (sum of timed and untimed services) 30

## 2024-01-30 ENCOUNTER — THERAPY VISIT (OUTPATIENT)
Dept: PHYSICAL THERAPY | Facility: OTHER | Age: 64
End: 2024-01-30
Attending: NURSE PRACTITIONER
Payer: MEDICARE

## 2024-01-30 DIAGNOSIS — M15.0 PRIMARY OSTEOARTHRITIS INVOLVING MULTIPLE JOINTS: ICD-10-CM

## 2024-01-30 PROCEDURE — 97162 PT EVAL MOD COMPLEX 30 MIN: CPT | Mod: GP

## 2024-01-30 PROCEDURE — 97110 THERAPEUTIC EXERCISES: CPT | Mod: GP

## 2024-01-30 NOTE — PROGRESS NOTES
PHYSICAL THERAPY EVALUATION  Type of Visit: Evaluation    See electronic medical record for Abuse and Falls Screening details.    Subjective Patient is a 63 year olf female with a primary complaint of right shoulder, right hip and bilateral knee pain. Patient reports that she is having problems with pain, loss of strength, locking or catching in the knee and dizziness. Patient states pain started after having COVID in December of 2023 and hasn't gotten better. Patient describes pain as sharp and aching. Pain is all the time. IT is made worse with lifting, carrying, certain positions and IADLs. Pain is better with walking, rest, and heat. Patient is right handed. Patient lives in a split level and has pain when ascending stairs. Aleve has helped pain but led to other symptoms. Pain affects falling and staying asleep. She is also unable to sleep on right side due to pain. Patient has no issues with ADLs. Pain limits activites and lifting. Pain is rated at 0/10 for best and 4/10 at its worst. X-rays show mild degenerative changes.      Presenting condition or subjective complaint:  Patient is a 63 year olf female with a primary complaint of right shoulder, right hip and bilateral knee pain.  Date of onset:  (December, 2023)        Prior Level of Function  Transfers: Independent  Ambulation: Independent  ADL: Independent  IADL: Driving, Finances, Housekeeping, Laundry, Meal preparation, Medication management, Yard work    Living Environment  Social support:     Type of home:   Split level  Stairs to enter the home:         Ramp:     Stairs inside the home:         Help at home:    Equipment owned:       Employment:      Hobbies/Interests:  Outdoor maintenance and playing with grandkids    Patient goals for therapy:  Decrease pain       Objective   SHOULDER EVALUATION  PAIN: Pain Level at Rest: 0/10  Pain Level with Use: 4/10  Pain Location: shoulder, hip, and knee  Pain Quality: Aching and Sharp  Pain Frequency:  constant  Pain is Worst: daytime  Pain is Exacerbated By: Activity  Pain is Relieved By: heat, rest, and use  Pain Progression: Unchanged  INTEGUMENTARY (edema, incisions): WNL  POSTURE:  Forward head and protracted shoulders  GAIT:   Weightbearing Status: WBAT  Assistive Device(s): None  Gait Deviations: WNL  WEIGHTBEARING ALIGNMENT: WNL  ROM:  Right shoulder flexion 160*, right shoulder abduction 160*. All other AROM in shoulder, hip and knee were WNL.  STRENGTH:  Right shoulder flexion, shoulder abduction, ER, and IR were 4-/5. Right hip flexion 3+/5  FLEXIBILITY: WFL but pain at end hip flexion  SPECIAL TESTS:  Painful arc, Maria-Olu, and Empty Can Test were positive on the right shoulder. Velez and VERN test were positive bilaterally. Hip distraction felt good.  PALPATION:  Pain with palpation over right biceps tendon.    Assessment & Plan   CLINICAL IMPRESSIONS  Medical Diagnosis: Primary osteoarthritis involving multiple joints (M15.9)    Treatment Diagnosis:     Impression/Assessment: Patient is a 63 year old female with a primary complaint of right hip, right shoulder, and bilateral knee pain.  The following significant findings have been identified: Pain, Decreased ROM/flexibility, Decreased strength, Decreased activity tolerance, and Impaired posture. These impairments interfere with their ability to perform self care tasks, work tasks, recreational activities, household chores, household mobility, and community mobility as compared to previous level of function.     Clinical Decision Making (Complexity):  Clinical Presentation: Stable/Uncomplicated  Clinical Presentation Rationale: based on medical and personal factors listed in PT evaluation  Clinical Decision Making (Complexity): Moderate complexity    PLAN OF CARE  Treatment Interventions:  Modalities: Cryotherapy, Hot Pack, Ultrasound  Interventions: Gait Training, Manual Therapy, Neuromuscular Re-education, Therapeutic Activity,  Therapeutic Exercise    Long Term Goals     PT Goal 1  Goal Identifier: Pain  Goal Description: Patient will report pain no greater then 1/10 in the evenings in order to lay on right side and sleep through the night in the next 6 weeks.  Rationale: to maximize safety and independence with performance of ADLs and functional tasks;to maximize safety and independence within the home  Target Date: 03/12/24  PT Goal 2  Goal Identifier: HEP  Goal Description: Patient will be compliant with HEP in order to progress with strength and ROM to return to PLOF in the next 8 weeks.  Rationale: to maximize safety and independence with performance of ADLs and functional tasks;to maximize safety and independence within the home;to maximize safety and independence within the community;to maximize safety and independence with transportation;to maximize safety and independence with self cares  Target Date: 03/26/24  PT Goal 3  Goal Identifier: Strength  Goal Description: Patient will demonstrate 4+/5 right shoulder flexion, shoulder abduction, hip flexion and hip abduction strength in order to be able to complete IADLs without adapations and in an appropriate timeframe in the next 12 weeks.  Rationale: to maximize safety and independence within the home;to maximize safety and independence with performance of ADLs and functional tasks;to maximize safety and independence within the community;to maximize safety and independence with transportation;to maximize safety and independence with self cares  Target Date: 04/23/24      Frequency of Treatment: 2x per week  Duration of Treatment: 12 weeks    Recommended Referrals to Other Professionals: Occupational Therapy for her carpal tunnel  Education Assessment:   Learner/Method: Patient;Listening;Reading;Demonstration;Pictures/Video;No Barriers to Learning    Risks and benefits of evaluation/treatment have been explained.   Patient/Family/caregiver agrees with Plan of Care.     Evaluation  Time:     PT Eval, Moderate Complexity Minutes (19322): 45     Signing Clinician: CHERRY HINTON, PT      [facility]                                                                                   OUTPATIENT PHYSICAL THERAPY      PLAN OF TREATMENT FOR OUTPATIENT REHABILITATION   Patient's Last Name, First Name, Josie Clark YOB: 1960   Provider's Name   Psychiatric   Medical Record No.  [id number]     Onset Date:  (December, 2023)  Start of Care Date: 01/30/24     Medical Diagnosis:  Primary osteoarthritis involving multiple joints (M15.9)      PT Treatment Diagnosis:    Plan of Treatment  Frequency/Duration: 2x per week/ 12 weeks    Certification date from 01/30/24 to 04/23/24         See note for plan of treatment details and functional goals     CHERRY HINTON, PT                         I CERTIFY THE NEED FOR THESE SERVICES FURNISHED UNDER        THIS PLAN OF TREATMENT AND WHILE UNDER MY CARE     (Physician attestation of this document indicates review and certification of the therapy plan).              Referring Provider:  Lakisha De La Cruz    Initial Assessment  See Epic Evaluation- Start of Care Date: 01/30/24

## 2024-01-31 ENCOUNTER — TELEPHONE (OUTPATIENT)
Dept: FAMILY MEDICINE | Facility: OTHER | Age: 64
End: 2024-01-31
Payer: COMMERCIAL

## 2024-01-31 DIAGNOSIS — M19.042 PRIMARY OSTEOARTHRITIS OF BOTH HANDS: ICD-10-CM

## 2024-01-31 DIAGNOSIS — M25.532 PAIN IN BOTH WRISTS: ICD-10-CM

## 2024-01-31 DIAGNOSIS — M19.041 PRIMARY OSTEOARTHRITIS OF BOTH HANDS: ICD-10-CM

## 2024-01-31 DIAGNOSIS — M15.0 PRIMARY OSTEOARTHRITIS INVOLVING MULTIPLE JOINTS: ICD-10-CM

## 2024-01-31 DIAGNOSIS — M25.531 PAIN IN BOTH WRISTS: ICD-10-CM

## 2024-01-31 DIAGNOSIS — G56.03 BILATERAL CARPAL TUNNEL SYNDROME: Primary | ICD-10-CM

## 2024-01-31 NOTE — TELEPHONE ENCOUNTER
Reason for call: Request for a referral.    Referral requested for what concern?  Hand/wrist    Have you already been seen by the specialty you need the referral for?  no    If yes, Date:   01/31/2024 ,  Location:   [facility],  Provider:   [facility]    If no,  Where do you want to go?   [facility]    Additional comments:   For PT/OT on hand and wrist    Preferred method for responding to this message: Telephone Call    Phone number patient can be reached at? Cell number on file:    Telephone Information:   Mobile [phone]       If we can't reach you directly, may we leave a detailed response at the number you provided? Yes

## 2024-02-01 NOTE — TELEPHONE ENCOUNTER
Left message notifying patient referral was placed.    Emeli Martinez LPN on 2/1/2024 at 12:47 PM

## 2024-02-01 NOTE — TELEPHONE ENCOUNTER
Patient would like referral for her hand and wrist pain. Patient would like to try PT or OT for this if it is recommended. Order pended    Emeli Martinez LPN on 2/1/2024 at 10:09 AM

## 2024-02-02 ENCOUNTER — THERAPY VISIT (OUTPATIENT)
Dept: PHYSICAL THERAPY | Facility: OTHER | Age: 64
End: 2024-02-02
Attending: NURSE PRACTITIONER
Payer: MEDICARE

## 2024-02-02 DIAGNOSIS — M15.0 PRIMARY OSTEOARTHRITIS INVOLVING MULTIPLE JOINTS: Primary | ICD-10-CM

## 2024-02-02 PROCEDURE — 97110 THERAPEUTIC EXERCISES: CPT | Mod: GP

## 2024-02-07 ENCOUNTER — THERAPY VISIT (OUTPATIENT)
Dept: PHYSICAL THERAPY | Facility: OTHER | Age: 64
End: 2024-02-07
Attending: NURSE PRACTITIONER
Payer: MEDICARE

## 2024-02-07 DIAGNOSIS — M15.0 PRIMARY OSTEOARTHRITIS INVOLVING MULTIPLE JOINTS: Primary | ICD-10-CM

## 2024-02-07 PROCEDURE — 97140 MANUAL THERAPY 1/> REGIONS: CPT | Mod: GP,CQ

## 2024-02-07 PROCEDURE — 97110 THERAPEUTIC EXERCISES: CPT | Mod: GP,CQ

## 2024-02-09 ENCOUNTER — THERAPY VISIT (OUTPATIENT)
Dept: PHYSICAL THERAPY | Facility: OTHER | Age: 64
End: 2024-02-09
Attending: NURSE PRACTITIONER
Payer: MEDICARE

## 2024-02-09 DIAGNOSIS — M15.0 PRIMARY OSTEOARTHRITIS INVOLVING MULTIPLE JOINTS: Primary | ICD-10-CM

## 2024-02-09 PROCEDURE — 97110 THERAPEUTIC EXERCISES: CPT | Mod: GP,CQ

## 2024-02-14 ENCOUNTER — THERAPY VISIT (OUTPATIENT)
Dept: OCCUPATIONAL THERAPY | Facility: OTHER | Age: 64
End: 2024-02-14
Attending: NURSE PRACTITIONER
Payer: MEDICARE

## 2024-02-14 ENCOUNTER — OFFICE VISIT (OUTPATIENT)
Dept: ORTHOPEDICS | Facility: OTHER | Age: 64
End: 2024-02-14
Attending: NURSE PRACTITIONER
Payer: COMMERCIAL

## 2024-02-14 VITALS
BODY MASS INDEX: 27.9 KG/M2 | HEART RATE: 78 BPM | SYSTOLIC BLOOD PRESSURE: 114 MMHG | WEIGHT: 155 LBS | DIASTOLIC BLOOD PRESSURE: 70 MMHG | OXYGEN SATURATION: 98 %

## 2024-02-14 DIAGNOSIS — M15.0 PRIMARY OSTEOARTHRITIS INVOLVING MULTIPLE JOINTS: ICD-10-CM

## 2024-02-14 DIAGNOSIS — M70.61 TROCHANTERIC BURSITIS OF RIGHT HIP: ICD-10-CM

## 2024-02-14 DIAGNOSIS — M25.531 PAIN IN BOTH WRISTS: ICD-10-CM

## 2024-02-14 DIAGNOSIS — M19.042 PRIMARY OSTEOARTHRITIS OF BOTH HANDS: ICD-10-CM

## 2024-02-14 DIAGNOSIS — M25.532 PAIN IN BOTH WRISTS: ICD-10-CM

## 2024-02-14 DIAGNOSIS — G56.03 BILATERAL CARPAL TUNNEL SYNDROME: ICD-10-CM

## 2024-02-14 DIAGNOSIS — M19.041 PRIMARY OSTEOARTHRITIS OF BOTH HANDS: ICD-10-CM

## 2024-02-14 PROCEDURE — 97165 OT EVAL LOW COMPLEX 30 MIN: CPT | Mod: GO

## 2024-02-14 PROCEDURE — 97110 THERAPEUTIC EXERCISES: CPT | Mod: GO

## 2024-02-14 PROCEDURE — G0463 HOSPITAL OUTPT CLINIC VISIT: HCPCS

## 2024-02-14 PROCEDURE — 99203 OFFICE O/P NEW LOW 30 MIN: CPT | Performed by: ORTHOPAEDIC SURGERY

## 2024-02-14 ASSESSMENT — PAIN SCALES - GENERAL: PAINLEVEL: MILD PAIN (2)

## 2024-02-14 NOTE — PROGRESS NOTES
OCCUPATIONAL THERAPY EVALUATION  Type of Visit: Evaluation    Right middle and ring finger have been numb for about 6 months.   Pain in both thumbs for a couple month.   Used to do a lot painting and plastering for work.    Patient enjoys quilting and sewing.   Had covid in December and her joints have been stiff and sore since then.     *Right grasp=  39 pounds   Lateral pinch=12 pounds  3 point pinch=11 pounds    Left grasp=30 pounds  Lateral pinch=12 pounds  3 point pinch (just thumb and middle finger)=10 pounds    See electronic medical record for Abuse and Falls Screening details.    Subjective      Presenting condition or subjective complaint:    Date of onset: 08/01/23    Relevant medical history:   See chart   Dates & types of surgery:  See chart     Prior diagnostic imaging/testing results:   See chart     Prior therapy history for the same diagnosis, illness or injury: No        Prior Level of Function  Transfers: Independent  Ambulation: Independent  ADL: Independent  IADL: Driving, Finances, Housekeeping, Laundry, Meal preparation, Yard work    Living Environment  Social support: Spouse     Type of home: House     Stairs to enter the home: Yes        Ramp:  No   Stairs inside the home:Yes          Help at home:  Spouse   Equipment owned:  NA     Employment: Retired      Hobbies/Interests:  Sewing, quilting, crocheting     Patient goals for therapy: Patient would like to decrease the numbness in her right  middle and ring finger and would like to decrease pain in bilateral thumbs and increase strength in bilateral hands.       Pain assessment:  up to 5/10 pain in bilateral thumbs.      Objective   Cognitive Status Examination  Orientation: Oriented to person, place and time   Level of Consciousness: Alert  Follows Commands and Answers Questions: 100% of the time  Personal Safety and Judgement: Intact  Memory: Intact  Attention: No deficits identified  Organization/Problem Solving: No deficits  identified  Executive Function: No deficits identified    SENSATION:  Numbness in right middle and ring finger     POSTURE: WNL  RANGE OF MOTION: UE AROM WFL  STRENGTH:  See above   MUSCLE TONE: WNL  COORDINATION: WNL  BALANCE: WNL    BED MOBILITY: Independent    TRANSFERS: Independent    BATHING: Independent    UPPER BODY DRESSING: Independent    LOWER BODY DRESSING: Independent    TOILETING: Independent    GROOMING: Independent    EATING/SELF FEEDING: Independent     ACTIVITY TOLERANCE: Good     INSTRUMENTAL ACTIVITIES OF DAILY LIVING (IADL): Independent but sometimes requires assistance to open tight lids/jars.     Assessment & Plan   CLINICAL IMPRESSIONS  Medical Diagnosis: Bilateral CMC arthritis, right carpal tunnel.    Treatment Diagnosis: Hand weakness, numbness and pain.    Impression/Assessment: Pt is a 63 year old female presenting to Occupational Therapy due to numbness in the right middle and ring finger and pain in bilateral thumbs.  The following significant findings have been identified: Impaired ROM, Impaired sensation, Impaired strength, and Pain.  These identified deficits interfere with their ability to perform recreational activities, household chores,  yard work, and meal planning and preparation as compared to previous level of function.     Clinical Decision Making (Complexity):  Assessment of Occupational Performance: 1-3 Performance Deficits  Occupational Performance Limitations: home establishment and management, meal preparation and cleanup, and leisure activities  Clinical Decision Making (Complexity): Low complexity    PLAN OF CARE  Treatment Interventions:  Modalities: Hot Pack, Iontophoresis, Paraffin, Ultrasound  Ionto with 4% dex   Interventions: Self-Care/Home Management, Therapeutic Activity, Therapeutic Exercise, Manual Therapy, Orthotic Fitting/Training    Long Term Goals   OT Goal 1  Goal Identifier: Hand strength  Goal Description: Patient will increase  strength by 6+  pounds on each side (at eval 2/14: Right=39 pounds, left 30 pounds).  Rationale: In order to maximize safety and independence with performance of self-care activities  Target Date: 04/24/24  OT Goal 2  Goal Identifier: Sewing/quilting  Goal Description: Patient will be able to sew/quilt for 30 minute increments with 2/10 pain or less in bilateral thumbs.  Rationale: In order to maximize safety and independence with performance of self-care activities  Target Date: 04/24/24  OT Goal 3  Goal Identifier: Numbness  Goal Description: Patient will reports a decrease in numbess in her right middle and ring finger when completing ADLs/leisure tasks.  Rationale: In order to maximize safety and independence with performance of self-care activities  Target Date: 04/24/24      Frequency of Treatment: 0-2x/week  Duration of Treatment: 10 weeks     Recommended Referrals to Other Professionals:  NA  Education Assessment: Learner/Method: Patient     Risks and benefits of evaluation/treatment have been explained.   Patient/Family/caregiver agrees with Plan of Care.     Evaluation Time:    OT Eval, Low Complexity Minutes (98961): 15      Signing Clinician: ROCIO Cardona [facility]                                                                                   OUTPATIENT OCCUPATIONAL THERAPY      PLAN OF TREATMENT FOR OUTPATIENT REHABILITATION   Patient's Last Name, First Name, Josie Clark YOB: 1960   Provider's Name   [facility]   Medical Record No.  [id number]     Onset Date: 08/01/23 Start of Care Date: 02/14/24     Medical Diagnosis:  Bilateral CMC arthritis, right carpal tunnel.      OT Treatment Diagnosis:  Hand weakness, numbness and pain. Plan of Treatment  Frequency/Duration:0-2x/week/10 weeks    Certification date from 02/14/24   To 05/08/24        See note for plan of treatment details and functional goals     Isabelle JACKSON  ROCIO Casiano                         I CERTIFY THE NEED FOR THESE SERVICES FURNISHED UNDER        THIS PLAN OF TREATMENT AND WHILE UNDER MY CARE .             Physician Signature               Date    X_____________________________________________________                  Referring Provider:  Lakisha De La Cruz    Initial Assessment  See Epic Evaluation- 02/14/24

## 2024-02-14 NOTE — PROGRESS NOTES
Surgical Clinic Consult  Primary physician:     Lakisha De La Cruz    Chief complaint:   Right hip pain    History of present illness:  This is a 63 year old female I am seeing in consultation for right hip pain along the lateral aspect.  Patient reports symptoms at nighttime as well.  Patient has been in therapy and making headway there in terms of less pain as well as discomfort.  Patient denies any significant groin pain or radiating pain at this point in time.  Patient denies any numbness or tingling as well.  Patient has been using a Voltaren gel at this point.  No injections have been done.    Past medical history:   Past Medical History:   Diagnosis Date    Anemia     No Comments Provided    Excessive and frequent menstruation with regular cycle     No Comments Provided    Major depressive disorder, single episode     History of depression    Personal history of other medical treatment (CODE)     age 14,for mono       Pastsurgical history:  Past Surgical History:   Procedure Laterality Date    COLONOSCOPY  07/24/2014 7/24/2014,10 year follow up    HYSTERECTOMY TOTAL ABDOMINAL      2004    LAPAROSCOPIC CHOLECYSTECTOMY      2/98    OTHER SURGICAL HISTORY      3/6/12,[id number],AMPUTATION,revision of left index finger, Dr Cortez       Current medications:  Current Outpatient Medications   Medication Sig Dispense Refill    ASPIRIN LOW DOSE 81 MG EC tablet TAKE 1 TABLET BY MOUTH EVERY  tablet 3    FLUoxetine (PROZAC) 10 MG capsule Take 1 capsule (10 mg) by mouth daily 7 capsule 0    FLUoxetine (PROZAC) 20 MG capsule Take 1 capsule (20 mg) by mouth daily 90 capsule 0    Multiple Vitamin (MULTI-VITAMINS) TABS Take 1 tablet by mouth daily      PARoxetine (PAXIL) 40 MG tablet Take 1 tablet (40 mg) by mouth daily (Patient taking differently: Take 20 mg by mouth daily) 90 tablet 3       Allergies:  Allergies   Allergen Reactions    Amoxicillin Rash       Family history:  Family History   Problem Relation Age of  Onset    Substance Abuse Mother         Alcohol/Drug,Problems with alcohol abuse    Heart Disease Father         Heart Disease,CAD, MI, did smoke    Diabetes Father         Diabetes    Hypertension Father         Hypertension    Hypertension Sister         Hypertension    Substance Abuse Sister     Bronchitis Sister     Heart Disease Sister     Hypertension Sister         Hypertension    Other - See Comments Other         Psychiatric illness,Depression, probably committed suicide    Breast Cancer Maternal Aunt         Cancer-breast    Cancer Maternal Aunt         Cancer, of pharyngeal cancer age 68    Heart Disease Other         Heart Disease,MI    Other - See Comments Other         smoked    Diabetes Other         Diabetes    Heart Disease Other         Heart Disease,CAD    Heart Disease Other         Heart Disease,CAD    Cancer Other         Cancer,Testicular cancer.    Hypertension Brother     Hypertension Brother     Hypertension Brother        Social history:  Social History     Socioeconomic History    Marital status:      Spouse name: Not on file    Number of children: Not on file    Years of education: Not on file    Highest education level: Not on file   Occupational History    Not on file   Tobacco Use    Smoking status: Never    Smokeless tobacco: Never   Vaping Use    Vaping Use: Never used   Substance and Sexual Activity    Alcohol use: No    Drug use: Never     Comment: Drug use: No    Sexual activity: Yes     Partners: Male   Other Topics Concern    Not on file   Social History Narrative    Lives with her .  is disabled. 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 3 children, adults     Social Determinants of Health     Financial Resource Strain: Low Risk  (1/15/2024)    Financial Resource Strain     Within the past 12 months, have you or your family members you live with been unable to get utilities (heat, electricity) when it was really needed?: No   Food Insecurity: Low Risk  (1/15/2024)     Food Insecurity     Within the past 12 months, did you worry that your food would run out before you got money to buy more?: No     Within the past 12 months, did the food you bought just not last and you didn t have money to get more?: No   Transportation Needs: Low Risk  (1/15/2024)    Transportation Needs     Within the past 12 months, has lack of transportation kept you from medical appointments, getting your medicines, non-medical meetings or appointments, work, or from getting things that you need?: No   Physical Activity: Not on file   Stress: Not on file   Social Connections: Not on file   Interpersonal Safety: Low Risk  (1/15/2024)    Interpersonal Safety     Do you feel physically and emotionally safe where you currently live?: Yes     Within the past 12 months, have you been hit, slapped, kicked or otherwise physically hurt by someone?: No     Within the past 12 months, have you been humiliated or emotionally abused in other ways by your partner or ex-partner?: No   Housing Stability: Low Risk  (1/15/2024)    Housing Stability     Do you have housing? 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    Physical exam: /70 (BP Location: Right arm, Patient Position: Sitting)   Pulse 78   Wt 70.3 kg (155 lb)   LMP  (LMP Unknown)   SpO2 98%   BMI 27.90 kg/m      General: this is a pleasant female patient in no acute distress.   Patient is awake alert and oriented x3 .   EXAM:  Chest/Respiratory Exam: Normal - Clear to auscultation without rales, rhonchi, or wheezing.  Cardiovascular Exam: normal  Musculoskeletal: Right hip examination shows tenderness across the trochanteric bursa.  Range of motion is well-tolerated.  Neuro examination intact.  Ligament examination stable.  Minimal pain with hip flexion or internal rotation.  No pain with straight leg raise.  Dorsalis pedis pulse 2+.    Imaging: AP lateral right hip shows mild arthrosis right hip joint.    Assessment:   Right hip bursitis    Plan:    Continue physical therapy and anti-inflammatories.  Should pain worsen I would advise that she does get cortisone based injection into the hip bursa.  Patient will contact us for scheduling of that if that in fact deems to be the case.  All questions answered as a pertains to this.      Raghav Prescott MD

## 2024-02-16 ENCOUNTER — THERAPY VISIT (OUTPATIENT)
Dept: OCCUPATIONAL THERAPY | Facility: OTHER | Age: 64
End: 2024-02-16
Attending: NURSE PRACTITIONER
Payer: MEDICARE

## 2024-02-16 ENCOUNTER — THERAPY VISIT (OUTPATIENT)
Dept: PHYSICAL THERAPY | Facility: OTHER | Age: 64
End: 2024-02-16
Attending: NURSE PRACTITIONER
Payer: MEDICARE

## 2024-02-16 DIAGNOSIS — M15.0 PRIMARY OSTEOARTHRITIS INVOLVING MULTIPLE JOINTS: Primary | ICD-10-CM

## 2024-02-16 DIAGNOSIS — M19.042 PRIMARY OSTEOARTHRITIS OF BOTH HANDS: Primary | ICD-10-CM

## 2024-02-16 DIAGNOSIS — M19.041 PRIMARY OSTEOARTHRITIS OF BOTH HANDS: Primary | ICD-10-CM

## 2024-02-16 PROCEDURE — 97140 MANUAL THERAPY 1/> REGIONS: CPT | Mod: GO | Performed by: OCCUPATIONAL THERAPIST

## 2024-02-16 PROCEDURE — 97140 MANUAL THERAPY 1/> REGIONS: CPT | Mod: GP

## 2024-02-16 PROCEDURE — 97110 THERAPEUTIC EXERCISES: CPT | Mod: GP

## 2024-02-16 PROCEDURE — 97035 APP MDLTY 1+ULTRASOUND EA 15: CPT | Mod: GO | Performed by: OCCUPATIONAL THERAPIST

## 2024-02-19 ENCOUNTER — THERAPY VISIT (OUTPATIENT)
Dept: PHYSICAL THERAPY | Facility: OTHER | Age: 64
End: 2024-02-19
Attending: NURSE PRACTITIONER
Payer: MEDICARE

## 2024-02-19 DIAGNOSIS — M15.0 PRIMARY OSTEOARTHRITIS INVOLVING MULTIPLE JOINTS: Primary | ICD-10-CM

## 2024-02-19 PROCEDURE — 97140 MANUAL THERAPY 1/> REGIONS: CPT | Mod: GP

## 2024-02-19 PROCEDURE — 97110 THERAPEUTIC EXERCISES: CPT | Mod: GP

## 2024-02-20 ENCOUNTER — THERAPY VISIT (OUTPATIENT)
Dept: OCCUPATIONAL THERAPY | Facility: OTHER | Age: 64
End: 2024-02-20
Attending: NURSE PRACTITIONER
Payer: MEDICARE

## 2024-02-20 DIAGNOSIS — M19.041 PRIMARY OSTEOARTHRITIS OF BOTH HANDS: Primary | ICD-10-CM

## 2024-02-20 DIAGNOSIS — M19.042 PRIMARY OSTEOARTHRITIS OF BOTH HANDS: Primary | ICD-10-CM

## 2024-02-20 PROCEDURE — 97140 MANUAL THERAPY 1/> REGIONS: CPT | Mod: GO

## 2024-02-20 PROCEDURE — 97110 THERAPEUTIC EXERCISES: CPT | Mod: GO

## 2024-02-20 PROCEDURE — 97035 APP MDLTY 1+ULTRASOUND EA 15: CPT | Mod: GO

## 2024-02-22 ENCOUNTER — THERAPY VISIT (OUTPATIENT)
Dept: OCCUPATIONAL THERAPY | Facility: OTHER | Age: 64
End: 2024-02-22
Attending: NURSE PRACTITIONER
Payer: MEDICARE

## 2024-02-22 DIAGNOSIS — M19.041 PRIMARY OSTEOARTHRITIS OF BOTH HANDS: Primary | ICD-10-CM

## 2024-02-22 DIAGNOSIS — M19.042 PRIMARY OSTEOARTHRITIS OF BOTH HANDS: Primary | ICD-10-CM

## 2024-02-22 PROCEDURE — 97110 THERAPEUTIC EXERCISES: CPT | Mod: GO | Performed by: OCCUPATIONAL THERAPIST

## 2024-02-22 PROCEDURE — 97140 MANUAL THERAPY 1/> REGIONS: CPT | Mod: GO | Performed by: OCCUPATIONAL THERAPIST

## 2024-02-26 ENCOUNTER — OFFICE VISIT (OUTPATIENT)
Dept: FAMILY MEDICINE | Facility: OTHER | Age: 64
End: 2024-02-26
Attending: NURSE PRACTITIONER
Payer: COMMERCIAL

## 2024-02-26 VITALS
OXYGEN SATURATION: 97 % | BODY MASS INDEX: 27.46 KG/M2 | HEART RATE: 72 BPM | WEIGHT: 155 LBS | TEMPERATURE: 97.7 F | RESPIRATION RATE: 16 BRPM | HEIGHT: 63 IN | SYSTOLIC BLOOD PRESSURE: 120 MMHG | DIASTOLIC BLOOD PRESSURE: 70 MMHG

## 2024-02-26 DIAGNOSIS — M15.0 PRIMARY OSTEOARTHRITIS INVOLVING MULTIPLE JOINTS: Primary | ICD-10-CM

## 2024-02-26 DIAGNOSIS — F41.9 ANXIETY: ICD-10-CM

## 2024-02-26 PROCEDURE — G0463 HOSPITAL OUTPT CLINIC VISIT: HCPCS

## 2024-02-26 PROCEDURE — 99213 OFFICE O/P EST LOW 20 MIN: CPT | Performed by: NURSE PRACTITIONER

## 2024-02-26 ASSESSMENT — ANXIETY QUESTIONNAIRES
GAD7 TOTAL SCORE: 6
GAD7 TOTAL SCORE: 6
2. NOT BEING ABLE TO STOP OR CONTROL WORRYING: SEVERAL DAYS
IF YOU CHECKED OFF ANY PROBLEMS ON THIS QUESTIONNAIRE, HOW DIFFICULT HAVE THESE PROBLEMS MADE IT FOR YOU TO DO YOUR WORK, TAKE CARE OF THINGS AT HOME, OR GET ALONG WITH OTHER PEOPLE: SOMEWHAT DIFFICULT
8. IF YOU CHECKED OFF ANY PROBLEMS, HOW DIFFICULT HAVE THESE MADE IT FOR YOU TO DO YOUR WORK, TAKE CARE OF THINGS AT HOME, OR GET ALONG WITH OTHER PEOPLE?: SOMEWHAT DIFFICULT
4. TROUBLE RELAXING: SEVERAL DAYS
GAD7 TOTAL SCORE: 6
3. WORRYING TOO MUCH ABOUT DIFFERENT THINGS: SEVERAL DAYS
6. BECOMING EASILY ANNOYED OR IRRITABLE: SEVERAL DAYS
7. FEELING AFRAID AS IF SOMETHING AWFUL MIGHT HAPPEN: NOT AT ALL
5. BEING SO RESTLESS THAT IT IS HARD TO SIT STILL: NOT AT ALL
1. FEELING NERVOUS, ANXIOUS, OR ON EDGE: MORE THAN HALF THE DAYS
7. FEELING AFRAID AS IF SOMETHING AWFUL MIGHT HAPPEN: NOT AT ALL

## 2024-02-26 ASSESSMENT — PATIENT HEALTH QUESTIONNAIRE - PHQ9
SUM OF ALL RESPONSES TO PHQ QUESTIONS 1-9: 4
10. IF YOU CHECKED OFF ANY PROBLEMS, HOW DIFFICULT HAVE THESE PROBLEMS MADE IT FOR YOU TO DO YOUR WORK, TAKE CARE OF THINGS AT HOME, OR GET ALONG WITH OTHER PEOPLE: NOT DIFFICULT AT ALL
SUM OF ALL RESPONSES TO PHQ QUESTIONS 1-9: 4

## 2024-02-26 ASSESSMENT — PAIN SCALES - GENERAL: PAINLEVEL: MILD PAIN (2)

## 2024-02-26 NOTE — PROGRESS NOTES
"  Assessment & Plan   Problem List Items Addressed This Visit          Musculoskeletal and Integumentary    Primary osteoarthritis involving multiple joints - Primary     Other Visit Diagnoses       Anxiety               Osteoarthritis, responding well to physical and Occupational Therapy as well as Tylenol arthritis.  Anxiety without significant changes since starting fluoxetine.  She has not had any negative side effects to medication.  She is aware that this may take several weeks to start showing benefit.  She will continue with current dose, follow-up in the next 3 to 4 weeks if she is not showing improvement in anxiety.  If symptoms are improving, continue with current dose.           BMI  Estimated body mass index is 27.9 kg/m  as calculated from the following:    Height as of this encounter: 1.588 m (5' 2.5\").    Weight as of this encounter: 70.3 kg (155 lb).           No follow-ups on file.      Samantha Butler is a 63 year old, presenting for the following health issues:  Recheck Medication    History of Present Illness       Mental Health Follow-up:  Patient presents to follow-up on Depression & Anxiety.Patient's depression since last visit has been:  No change  The patient is not having other symptoms associated with depression.  Patient's anxiety since last visit has been:  No change  The patient is not having other symptoms associated with anxiety.  Any significant life events: financial concerns and health concerns  Patient is not feeling anxious or having panic attacks.  Patient has no concerns about alcohol or drug use.    She eats 0-1 servings of fruits and vegetables daily.She consumes 2 sweetened beverage(s) daily.She exercises with enough effort to increase her heart rate 10 to 19 minutes per day.  She exercises with enough effort to increase her heart rate 3 or less days per week.   She is taking medications regularly.     She comes to clinic today for follow-up.  Approximate 4 weeks ago she " "was seen, tapered off of paroxetine and started on fluoxetine.  She reports she started fluoxetine about 3 weeks ago, has not noticed any changes in her anxiety.  She has anxiety symptoms approximately 3 times a week, she is unable to relate this to any specific issue or time of day.  She is sleeping okay.  Continues to do routine exercise, walking outside 5 times a week.  She has been working with physical and occupational therapy as well as Tylenol arthritis as needed for osteo arthritis and this has been helpful.          Review of Systems  See above      Objective    /70   Pulse 72   Temp 97.7  F (36.5  C)   Resp 16   Ht 1.588 m (5' 2.5\")   Wt 70.3 kg (155 lb)   LMP  (LMP Unknown)   SpO2 97%   BMI 27.90 kg/m    Body mass index is 27.9 kg/m .  Physical Exam   GENERAL: alert and no distress  EYES: Eyes grossly normal to inspection  NEURO: Normal strength and tone, mentation intact and speech normal  PSYCH: mentation appears normal, affect normal/bright            Signed Electronically by: ALVERTO aHines CNP    "

## 2024-02-26 NOTE — NURSING NOTE
Patient presents today for follow up on depression and anxiety.    Medication Reconciliation Complete    Emeli Martinez LPN  2/26/2024 8:58 AM

## 2024-03-01 ENCOUNTER — THERAPY VISIT (OUTPATIENT)
Dept: PHYSICAL THERAPY | Facility: OTHER | Age: 64
End: 2024-03-01
Attending: NURSE PRACTITIONER
Payer: MEDICARE

## 2024-03-01 DIAGNOSIS — M15.0 PRIMARY OSTEOARTHRITIS INVOLVING MULTIPLE JOINTS: Primary | ICD-10-CM

## 2024-03-01 PROCEDURE — 97110 THERAPEUTIC EXERCISES: CPT | Mod: GP

## 2024-03-01 PROCEDURE — 97140 MANUAL THERAPY 1/> REGIONS: CPT | Mod: GP

## 2024-03-08 ENCOUNTER — THERAPY VISIT (OUTPATIENT)
Dept: OCCUPATIONAL THERAPY | Facility: OTHER | Age: 64
End: 2024-03-08
Attending: NURSE PRACTITIONER
Payer: MEDICARE

## 2024-03-08 DIAGNOSIS — M19.042 PRIMARY OSTEOARTHRITIS OF BOTH HANDS: Primary | ICD-10-CM

## 2024-03-08 DIAGNOSIS — M19.041 PRIMARY OSTEOARTHRITIS OF BOTH HANDS: Primary | ICD-10-CM

## 2024-03-08 PROCEDURE — 97110 THERAPEUTIC EXERCISES: CPT | Mod: GO

## 2024-03-08 PROCEDURE — 97140 MANUAL THERAPY 1/> REGIONS: CPT | Mod: GO

## 2024-04-14 DIAGNOSIS — F41.9 ANXIETY: ICD-10-CM

## 2024-04-14 DIAGNOSIS — F33.42 RECURRENT MAJOR DEPRESSIVE DISORDER, IN FULL REMISSION (H): ICD-10-CM

## 2024-04-16 NOTE — TELEPHONE ENCOUNTER
Patient has 20 mg of this left Fluoxetine for a month- would like to get 10mg for a month to up it to 30mg- states was discussed

## 2024-04-17 RX ORDER — FLUOXETINE 10 MG/1
10 CAPSULE ORAL DAILY
Qty: 90 CAPSULE | Refills: 0 | Status: SHIPPED | OUTPATIENT
Start: 2024-04-17 | End: 2024-05-21

## 2024-04-17 NOTE — TELEPHONE ENCOUNTER
Called patient. She states that her anxiety is not under good control and it was dicussed at [facility] if she needed an increased dose, to let her PCP know. Currently has a months worth of Fluoxetine 30 mg capsules.     RAFA ANDERSON RN on 4/17/2024 at 11:03 AM

## 2024-04-26 NOTE — PROGRESS NOTES
DISCHARGE  Reason for Discharge: Patient has failed to schedule further appointments.    Equipment Issued: None    Discharge Plan: Patient to continue home program.    Referring Provider:  Lakisha De La Cruz        03/01/24 0500   Appointment Info   Signing clinician's name / credentials Aga Faith, PT, DPT   Total/Authorized Visits 7 of 10   Visits Used 7   Medical Diagnosis Primary osteoarthritis involving multiple joints (M15.9)   Progress Note/Certification   Start of Care Date 01/30/24   Onset of illness/injury or Date of Surgery   (December, 2023)   Therapy Frequency 2x per week   Predicted Duration 12 weeks   Certification date from 01/30/24   Certification date to 04/23/24   Supervision   PT Assistant Visit Number 2   PT Goal 1   Goal Identifier Pain   Goal Description Patient will report pain no greater then 1/10 in the evenings in order to lay on right side and sleep through the night in the next 6 weeks.   Rationale to maximize safety and independence with performance of ADLs and functional tasks;to maximize safety and independence within the home   Target Date 03/12/24   PT Goal 2   Goal Identifier HEP   Goal Description Patient will be compliant with HEP in order to progress with strength and ROM to return to PLOF in the next 8 weeks.   Rationale to maximize safety and independence with performance of ADLs and functional tasks;to maximize safety and independence within the home;to maximize safety and independence within the community;to maximize safety and independence with transportation;to maximize safety and independence with self cares   Target Date 03/26/24   PT Goal 3   Goal Identifier Strength   Goal Description Patient will demonstrate 4+/5 right shoulder flexion, shoulder abduction, hip flexion and hip abduction strength in order to be able to complete IADLs without adapations and in an appropriate timeframe in the next 12 weeks.   Rationale to maximize safety and independence within  the home;to maximize safety and independence with performance of ADLs and functional tasks;to maximize safety and independence within the community;to maximize safety and independence with transportation;to maximize safety and independence with self cares   Target Date 04/23/24   Subjective Report   Subjective Report Carrie reports that she fell on Monday. She tripped while out in the woods. She feels like she landed on her right hip and has had increased pain since then. Right shoulder is painful with nerve glossing. Patient reports that she has been doing her HEP. Patient has been on walks over the last couple of days. Patient reports pain as 2-3/10 in the right hip.   Objective Measure 1   Objective Measure ROM   Details Right shoulder flexion 160*, right shoulder abduction 160*. All other AROM in shoulder, hip and knee were WNL.   Objective Measure 2   Objective Measure Strength   Details Right shoulder flexion, shoulder abduction, ER, and IR were 4-/5. Right hip flexion 3+/5   Objective Measure 3   Objective Measure Palpation   Details Pain with palpation over right biceps tendon.   Objective Measure 4   Objective Measure Posture   Details Forward head and protracted shoulders   Objective Measure 5   Objective Measure Special Tests   Details Painful arc, Maria-Olu, and Empty Can Test were positive on the right shoulder. Velez and VERN test were positive bilaterally. Hip distraction felt good.   Therapeutic Procedure/Exercise   Therapeutic Procedures: strength, endurance, ROM, flexibility minutes (78364) 25   Ther Proc 1 NuStep level 5 with arms for 10 minutes, lateral step down 4in step x20 bilaterally, standing hip abduction on foam x20 bilaterally, standing hip extension on foam x20 bilaterally, mini squats on foam x20   Ther Proc 1 - Details Patient required verbal and tactile cuing in order to complete therapeutic exercises with proper form. Rest breaks required due to fatigue. Cuing to keep toes  pointed forward with hip abduction. Decreased control on the RLE with lateral step downs. Muscle fatigue noted with exercises. Felt good with stretches.   Skilled Intervention Cuing for proper form in order to increase strength/ROM to decrease pain and return to PLOF. Adjusting exercise in order for patient to make maximum gains and fatigue in the appropriate time frame.   Patient Response/Progress Patient tolerated well   Manual Therapy   Manual Therapy: Mobilization, MFR, MLD, friction massage minutes (33007) 15   Manual Therapy 1 MFR/STM   Manual Therapy 1 - Details STM to right hip muscles, ITB and QL and to right biceps   Skilled Intervention STM in order to decrease muscle tension and improve mobility   Patient Response/Progress felt good after   Education   Learner/Method Patient;Listening;Reading;Demonstration;Pictures/Video;No Barriers to Learning   Plan   Home program HEP provided with visual and written instruction   Comments   Comments Patient is a 63 year old female with a primary complaint of right shoulder, right hip and bilateral knee pain. Patient reports that she is having problems with pain, loss of strength, locking or catching in the knee and dizziness. Patient states pain started after having COVID in December of 2023 and hasn't gotten better. Patient describes pain as sharp and aching. Pain is all the time. IT is made worse with lifting, carrying, certain positions and IADLs. Pain is better with walking, rest, and heat. Patient is right handed. Patient lives in a split level and has pain when ascending stairs. Aleve has helped pain but led to other symptoms. Pain affects falling and staying asleep. She is also unable to sleep on right side due to pain. Patient has no issues with ADLs. Pain limits activites and lifting. Pain is rated at 0/10 for best and 4/10 at its worst.   Total Session Time   Timed Code Treatment Minutes 40   Total Treatment Time (sum of timed and untimed services) 40

## 2024-05-15 DIAGNOSIS — F33.42 RECURRENT MAJOR DEPRESSIVE DISORDER, IN FULL REMISSION (H): ICD-10-CM

## 2024-05-15 DIAGNOSIS — F41.9 ANXIETY: ICD-10-CM

## 2024-05-15 RX ORDER — FLUOXETINE 10 MG/1
CAPSULE ORAL
Qty: 90 CAPSULE | Refills: 0 | OUTPATIENT
Start: 2024-05-15

## 2024-05-15 NOTE — TELEPHONE ENCOUNTER
[facility] in #[id number] in [facility] [place] sent Rx request for the following:      Redundant refill request refused: Too soon:  FLUoxetine (PROZAC) 10 MG capsule 90 capsule 0 4/17/2024 -- No   Sig - Route: Take 1 capsule (10 mg) by mouth daily - Oral   Sent to pharmacy as: FLUoxetine HCl 10 MG Oral Capsule (PROzac)   Class: E-Prescribe   Notes to Pharmacy: Take in addition to the Fluoxetine 20 mg capsule to equal 30 mg total.   Order: [id number]   E-Prescribing Status: Receipt confirmed by pharmacy (4/17/2024 11:43 AM CDT)     Printout Tracking    External Result Report     Pharmacy    [id number] - [place], [place] - [address]   Kourtney Mcallister RN .............. 5/15/2024  11:00 AM

## 2024-05-18 DIAGNOSIS — F41.9 ANXIETY: ICD-10-CM

## 2024-05-18 DIAGNOSIS — F33.42 RECURRENT MAJOR DEPRESSIVE DISORDER, IN FULL REMISSION (H): ICD-10-CM

## 2024-05-20 NOTE — TELEPHONE ENCOUNTER
Reason for call: Medication or medication refill    Have you contacted your pharmacy regarding this refill? Yes    If No, direct the patient to contact the Pharmacy and discontinue telephone note using Erroneous Encounter.  If Yes, continue.    Name of medication requested: Fluoxetine 20mg    How many days of medication do you have left? 0    What pharmacy do you use? [facility] [place]    Preferred method for responding to this message: Telephone Call    Phone number patient can be reached at: Cell number on file:    Telephone Information:   Mobile [phone]       If we cannot reach you directly, may we leave a detailed response at the number you provided? Yes    Patient called regarding needing both the 10 and 20 filled as she has none left.    Nancy Lara on 5/20/2024 at 9:37 AM

## 2024-05-21 RX ORDER — FLUOXETINE 10 MG/1
CAPSULE ORAL
Qty: 90 CAPSULE | Refills: 0 | Status: SHIPPED | OUTPATIENT
Start: 2024-05-21 | End: 2024-08-08 | Stop reason: DRUGHIGH

## 2024-05-21 NOTE — TELEPHONE ENCOUNTER
Refilled fluoxetine 30 mg daily.  Dose was increased last month, I would like to have her schedule follow-up appointment in clinic to discuss anxiety and medications further.  This can be scheduled at her convenience over the next 1 to 2 months. ALVERTO Haines CNP on 5/21/2024 at 10:17 AM

## 2024-05-21 NOTE — TELEPHONE ENCOUNTER
Detailed message left for Pt with provider response. Kourtney Mcallister RN .............. 5/21/2024  10:38 AM

## 2024-05-23 NOTE — PROGRESS NOTES
DISCHARGE  Reason for Discharge: Patient has met all goals.    Equipment Issued: NA    Discharge Plan: Patient to continue home program.    Referring Provider:  Lakisha De La Cruz        03/08/24 0500   Appointment Info   Treating Provider Isabelle Casiano, OTR/L   Visits Used 5   Medical Diagnosis Bilateral CMC arthritis, right carpal tunnel.   OT Tx Diagnosis Hand weakness, numbness and pain.   Progress Note/Certification   Start Of Care Date 02/14/24   Onset of Illness/Injury or Date of Surgery 08/01/23   Therapy Frequency 0-2x/week   Predicted Duration 10 weeks   Certification date from 02/14/24   Certification date to 05/08/24   KX Modifier Statement I certify the need for these services furnished under this plan of treatment and while under my care.  (Physician co-signature of this document indicates review and certification of the therapy plan)   OT Goal 1   Goal Identifier Hand strength   Goal Description Patient will increase  strength by 6+ pounds on each side (at eval 2/14: Right=39 pounds, left 30 pounds).   Rationale In order to maximize safety and independence with performance of self-care activities   Goal Progress 3/8/2024: Right= 47 left= 47   [place] Date 04/24/24   Date Met 03/08/24   OT Goal 2   Goal Identifier Sewing/quilting   Goal Description Patient will be able to sew/quilt for 30 minute increments with 2/10 pain or less in bilateral thumbs.   Rationale In order to maximize safety and independence with performance of self-care activities   [place] Date 04/24/24   Goal Progress Patient reports she has been able to sew for at least 30 minutes with less than 2/10 pain.   Date Met 03/08/24   OT Goal 3   Goal Identifier Numbness   Goal Description Patient will reports a decrease in numbess in her right middle and ring finger when completing ADLs/leisure tasks.   Rationale In order to maximize safety and independence with performance of self-care activities   [place] Date 04/24/24   Goal Progress  Patient reports numbness has decreased. Ring finger is much better but still some numbness present in middle finger.   Date Met 03/08/24   Subjective Report   Subjective Report Carrie reports she has noticed her hands getting stronger as she isn't dropping things as much. Numbness is improving but her middle finger is still a little bit tingly.   Therapeutic Procedure/Exercise   Therapeutic Procedure: strength, endurance, ROM, flexibillity minutes (79730) 20   Ther Proc 1 - Details Completed PROM/AROM to all the fingers, thumb, and wrists. Nerve glides on either side 1 set x10 reps holding for a count of 10. Completed 4 exercises using the yellow flex bar 1 set x15 reps of each. Completed 5 hand strengthening exercsies with theraputty and sent home as HEP. Reivewed HEP. Assessed strength see above.   Skilled Intervention Educated provided regarding UE anatomy, splinting, heat use, and exercises.   Patient Response/Progress educaetd in and performed EDC glides and strengthening and added to HEP. educate on decreaseing tension with activities to lessen strain on joints.   Manual Therapy   Manual Therapy Minutes (31394) 20   Manual Therapy 1 STM/IASTM to thenar area and forearms. Pressure point release at web space.   Skilled Intervention proper michelle techniques, educate on ergonomic and joint protection   Patient Response/Progress Patient tolerated well.   Education   Learner/Method Patient   Plan   Home program Heat, median nerve glides at the wrist and BUE, CMC exercises, c hold and index finger lifts.   Plan for next session Follow up as needed. Continue with HEP.   Total Session Time   Timed Code Treatment Minutes 40   Total Treatment Time (sum of timed and untimed services) 40        36.4

## 2024-08-08 ENCOUNTER — OFFICE VISIT (OUTPATIENT)
Dept: FAMILY MEDICINE | Facility: OTHER | Age: 64
End: 2024-08-08
Attending: NURSE PRACTITIONER
Payer: COMMERCIAL

## 2024-08-08 VITALS
OXYGEN SATURATION: 98 % | DIASTOLIC BLOOD PRESSURE: 80 MMHG | BODY MASS INDEX: 27.21 KG/M2 | WEIGHT: 153.6 LBS | HEIGHT: 63 IN | SYSTOLIC BLOOD PRESSURE: 136 MMHG | HEART RATE: 68 BPM | RESPIRATION RATE: 16 BRPM | TEMPERATURE: 98.3 F

## 2024-08-08 DIAGNOSIS — F33.42 RECURRENT MAJOR DEPRESSIVE DISORDER, IN FULL REMISSION (H): ICD-10-CM

## 2024-08-08 DIAGNOSIS — M15.0 PRIMARY OSTEOARTHRITIS INVOLVING MULTIPLE JOINTS: ICD-10-CM

## 2024-08-08 DIAGNOSIS — M25.551 BILATERAL HIP PAIN: ICD-10-CM

## 2024-08-08 DIAGNOSIS — M51.369 DDD (DEGENERATIVE DISC DISEASE), LUMBAR: ICD-10-CM

## 2024-08-08 DIAGNOSIS — G89.29 CHRONIC MIDLINE LOW BACK PAIN WITHOUT SCIATICA: ICD-10-CM

## 2024-08-08 DIAGNOSIS — M25.552 BILATERAL HIP PAIN: ICD-10-CM

## 2024-08-08 DIAGNOSIS — M54.50 CHRONIC MIDLINE LOW BACK PAIN WITHOUT SCIATICA: ICD-10-CM

## 2024-08-08 DIAGNOSIS — F41.9 ANXIETY: Primary | ICD-10-CM

## 2024-08-08 PROCEDURE — G0463 HOSPITAL OUTPT CLINIC VISIT: HCPCS

## 2024-08-08 PROCEDURE — G2211 COMPLEX E/M VISIT ADD ON: HCPCS | Performed by: NURSE PRACTITIONER

## 2024-08-08 PROCEDURE — 99214 OFFICE O/P EST MOD 30 MIN: CPT | Performed by: NURSE PRACTITIONER

## 2024-08-08 RX ORDER — FLUOXETINE 40 MG/1
40 CAPSULE ORAL DAILY
Qty: 90 CAPSULE | Refills: 4 | Status: SHIPPED | OUTPATIENT
Start: 2024-08-08

## 2024-08-08 ASSESSMENT — ANXIETY QUESTIONNAIRES
IF YOU CHECKED OFF ANY PROBLEMS ON THIS QUESTIONNAIRE, HOW DIFFICULT HAVE THESE PROBLEMS MADE IT FOR YOU TO DO YOUR WORK, TAKE CARE OF THINGS AT HOME, OR GET ALONG WITH OTHER PEOPLE: SOMEWHAT DIFFICULT
5. BEING SO RESTLESS THAT IT IS HARD TO SIT STILL: NOT AT ALL
2. NOT BEING ABLE TO STOP OR CONTROL WORRYING: SEVERAL DAYS
1. FEELING NERVOUS, ANXIOUS, OR ON EDGE: MORE THAN HALF THE DAYS
GAD7 TOTAL SCORE: 9
6. BECOMING EASILY ANNOYED OR IRRITABLE: SEVERAL DAYS
8. IF YOU CHECKED OFF ANY PROBLEMS, HOW DIFFICULT HAVE THESE MADE IT FOR YOU TO DO YOUR WORK, TAKE CARE OF THINGS AT HOME, OR GET ALONG WITH OTHER PEOPLE?: SOMEWHAT DIFFICULT
3. WORRYING TOO MUCH ABOUT DIFFERENT THINGS: MORE THAN HALF THE DAYS
4. TROUBLE RELAXING: NEARLY EVERY DAY
7. FEELING AFRAID AS IF SOMETHING AWFUL MIGHT HAPPEN: NOT AT ALL
GAD7 TOTAL SCORE: 9
7. FEELING AFRAID AS IF SOMETHING AWFUL MIGHT HAPPEN: NOT AT ALL

## 2024-08-08 ASSESSMENT — PATIENT HEALTH QUESTIONNAIRE - PHQ9: SUM OF ALL RESPONSES TO PHQ QUESTIONS 1-9: 4

## 2024-08-08 ASSESSMENT — PAIN SCALES - GENERAL: PAINLEVEL: MILD PAIN (2)

## 2024-08-08 NOTE — NURSING NOTE
Patient presents today for follow up on back/hip pain.     Medication Reconciliation Complete    Emeli Martinez LPN  8/8/2024 8:21 AM

## 2024-08-08 NOTE — PROGRESS NOTES
Assessment & Plan   Problem List Items Addressed This Visit          Musculoskeletal and Integumentary    Primary osteoarthritis involving multiple joints    Relevant Orders    Chiropractic Referral       Behavioral    Recurrent major depressive disorder, in full remission (H24)    Relevant Medications    FLUoxetine (PROZAC) 40 MG capsule     Other Visit Diagnoses       Anxiety    -  Primary    Relevant Medications    FLUoxetine (PROZAC) 40 MG capsule    DDD (degenerative disc disease), lumbar        Relevant Orders    Chiropractic Referral    Chronic midline low back pain without sciatica        Relevant Orders    Chiropractic Referral    Bilateral hip pain        Relevant Orders    Chiropractic Referral           Depression and anxiety, doing well with fluoxetine, not fully controlled, increase to 40 mg daily.  She is due for Medicare wellness, will follow-up in the next 1 to 2 months for wellness as well as follow-up on anxiety and depression.    Tolerating Tylenol arthritis well for pain management.  Not interested in injections quite yet.  Wondering about chiropractic care, referral placed.    The longitudinal plan of care for the diagnosis(es)/condition(s) as documented were addressed during this visit. Due to the added complexity in care, I will continue to support Carrie in the subsequent management and with ongoing continuity of care.      No follow-ups on file.      Subjective   Carrie is a 64 year old, presenting for the following health issues:  RECHECK (Back/hip pain)    History of Present Illness       Back Pain:  She presents for follow up of back pain. Patient's back pain is a recurring problem.  Location of back pain:  Right lower back and right hip  Description of back pain: other  Back pain spreads: nowhere    Since patient first noticed back pain, pain is: unchanged  Does back pain interfere with her job:  No       Mental Health Follow-up:  Patient presents to follow-up on Depression &  "Anxiety.Patient's depression since last visit has been:  Medium  The patient is having other symptoms associated with depression.  Patient's anxiety since last visit has been:  Worse  The patient is having other symptoms associated with anxiety.  Any significant life events: health concerns  Patient is feeling anxious or having panic attacks.  Patient has no concerns about alcohol or drug use.    She eats 2-3 servings of fruits and vegetables daily.She consumes 2 sweetened beverage(s) daily.She exercises with enough effort to increase her heart rate 9 or less minutes per day.  She exercises with enough effort to increase her heart rate 3 or less days per week.   She is taking medications regularly.     She presents to clinic today to discuss back and hip issues as well as anxiety.  She has ongoing arthritis of multiple joints, degenerative disc disease and lumbar back with chronic low back pain as well as bilateral hip pain.  She has done physical therapy, continues with stretches.  Has had an evaluation with orthopedics, injection was offered when she is ready but she does not feel she is ready yet.  She has been taking Tylenol arthritis twice daily which she has found to be helpful for her pain.  Continues to take fluoxetine for anxiety, tolerating medication well.  She does feel her anxiety is not as well-controlled as she would like, wondering about an increased dose.        Objective    /80   Pulse 68   Temp 98.3  F (36.8  C)   Resp 16   Ht 1.588 m (5' 2.5\")   Wt 69.7 kg (153 lb 9.6 oz)   LMP  (LMP Unknown)   SpO2 98%   BMI 27.65 kg/m    Body mass index is 27.65 kg/m .  Physical Exam   GENERAL: alert and no distress  EYES: Eyes grossly normal to inspection  RESP: lungs clear to auscultation - no rales, rhonchi or wheezes  CV: regular rate and rhythm, normal S1 S2  NEURO: Normal strength and tone, mentation intact and speech normal  PSYCH: mentation appears normal, affect normal/bright        "     Signed Electronically by: ALVERTO Haines CNP

## 2024-09-10 ENCOUNTER — OFFICE VISIT (OUTPATIENT)
Dept: FAMILY MEDICINE | Facility: OTHER | Age: 64
End: 2024-09-10
Attending: NURSE PRACTITIONER
Payer: COMMERCIAL

## 2024-09-10 VITALS
HEART RATE: 66 BPM | SYSTOLIC BLOOD PRESSURE: 126 MMHG | DIASTOLIC BLOOD PRESSURE: 74 MMHG | WEIGHT: 150.38 LBS | HEIGHT: 63 IN | RESPIRATION RATE: 20 BRPM | TEMPERATURE: 98.3 F | BODY MASS INDEX: 26.64 KG/M2 | OXYGEN SATURATION: 97 %

## 2024-09-10 DIAGNOSIS — Z13.220 LIPID SCREENING: ICD-10-CM

## 2024-09-10 DIAGNOSIS — Z12.11 COLON CANCER SCREENING: ICD-10-CM

## 2024-09-10 DIAGNOSIS — F41.9 ANXIETY: ICD-10-CM

## 2024-09-10 DIAGNOSIS — Z12.31 ENCOUNTER FOR SCREENING MAMMOGRAM FOR BREAST CANCER: ICD-10-CM

## 2024-09-10 DIAGNOSIS — M15.0 PRIMARY OSTEOARTHRITIS INVOLVING MULTIPLE JOINTS: ICD-10-CM

## 2024-09-10 DIAGNOSIS — Z00.00 ENCOUNTER FOR MEDICARE ANNUAL WELLNESS EXAM: Primary | ICD-10-CM

## 2024-09-10 DIAGNOSIS — F33.42 RECURRENT MAJOR DEPRESSIVE DISORDER, IN FULL REMISSION (H): ICD-10-CM

## 2024-09-10 DIAGNOSIS — E78.2 MIXED HYPERLIPIDEMIA: ICD-10-CM

## 2024-09-10 DIAGNOSIS — R32 URINARY INCONTINENCE, UNSPECIFIED TYPE: ICD-10-CM

## 2024-09-10 LAB
ALBUMIN SERPL BCG-MCNC: 4.6 G/DL (ref 3.5–5.2)
ALP SERPL-CCNC: 102 U/L (ref 40–150)
ALT SERPL W P-5'-P-CCNC: 31 U/L (ref 0–50)
ANION GAP SERPL CALCULATED.3IONS-SCNC: 9 MMOL/L (ref 7–15)
AST SERPL W P-5'-P-CCNC: 20 U/L (ref 0–45)
BILIRUB SERPL-MCNC: 0.6 MG/DL
BUN SERPL-MCNC: 15.4 MG/DL (ref 8–23)
CALCIUM SERPL-MCNC: 9.6 MG/DL (ref 8.8–10.4)
CHLORIDE SERPL-SCNC: 103 MMOL/L (ref 98–107)
CHOLEST SERPL-MCNC: 254 MG/DL
CREAT SERPL-MCNC: 0.84 MG/DL (ref 0.51–0.95)
EGFRCR SERPLBLD CKD-EPI 2021: 76 ML/MIN/1.73M2
FASTING STATUS PATIENT QL REPORTED: ABNORMAL
FASTING STATUS PATIENT QL REPORTED: ABNORMAL
GLUCOSE SERPL-MCNC: 101 MG/DL (ref 70–99)
HCO3 SERPL-SCNC: 28 MMOL/L (ref 22–29)
HDLC SERPL-MCNC: 52 MG/DL
LDLC SERPL CALC-MCNC: 179 MG/DL
NONHDLC SERPL-MCNC: 202 MG/DL
POTASSIUM SERPL-SCNC: 4.2 MMOL/L (ref 3.4–5.3)
PROT SERPL-MCNC: 7.5 G/DL (ref 6.4–8.3)
SODIUM SERPL-SCNC: 140 MMOL/L (ref 135–145)
TRIGL SERPL-MCNC: 116 MG/DL

## 2024-09-10 PROCEDURE — 36415 COLL VENOUS BLD VENIPUNCTURE: CPT | Mod: ZL | Performed by: NURSE PRACTITIONER

## 2024-09-10 PROCEDURE — 99214 OFFICE O/P EST MOD 30 MIN: CPT | Mod: 25 | Performed by: NURSE PRACTITIONER

## 2024-09-10 PROCEDURE — 80053 COMPREHEN METABOLIC PANEL: CPT | Mod: ZL | Performed by: NURSE PRACTITIONER

## 2024-09-10 PROCEDURE — G0463 HOSPITAL OUTPT CLINIC VISIT: HCPCS

## 2024-09-10 PROCEDURE — G0439 PPPS, SUBSEQ VISIT: HCPCS | Performed by: NURSE PRACTITIONER

## 2024-09-10 PROCEDURE — 80061 LIPID PANEL: CPT | Mod: ZL | Performed by: NURSE PRACTITIONER

## 2024-09-10 RX ORDER — IBUPROFEN 200 MG
2 CAPSULE ORAL DAILY PRN
COMMUNITY

## 2024-09-10 RX ORDER — SWAB
1 SWAB, NON-MEDICATED MISCELLANEOUS DAILY PRN
COMMUNITY

## 2024-09-10 ASSESSMENT — ANXIETY QUESTIONNAIRES
GAD7 TOTAL SCORE: 5
7. FEELING AFRAID AS IF SOMETHING AWFUL MIGHT HAPPEN: NOT AT ALL
8. IF YOU CHECKED OFF ANY PROBLEMS, HOW DIFFICULT HAVE THESE MADE IT FOR YOU TO DO YOUR WORK, TAKE CARE OF THINGS AT HOME, OR GET ALONG WITH OTHER PEOPLE?: SOMEWHAT DIFFICULT

## 2024-09-10 ASSESSMENT — PATIENT HEALTH QUESTIONNAIRE - PHQ9
SUM OF ALL RESPONSES TO PHQ QUESTIONS 1-9: 4
10. IF YOU CHECKED OFF ANY PROBLEMS, HOW DIFFICULT HAVE THESE PROBLEMS MADE IT FOR YOU TO DO YOUR WORK, TAKE CARE OF THINGS AT HOME, OR GET ALONG WITH OTHER PEOPLE: SOMEWHAT DIFFICULT
SUM OF ALL RESPONSES TO PHQ QUESTIONS 1-9: 4

## 2024-09-10 ASSESSMENT — PAIN SCALES - GENERAL: PAINLEVEL: MILD PAIN (2)

## 2024-09-10 NOTE — PATIENT INSTRUCTIONS
Patient Education   Preventive Care Advice   This is general advice given by our system to help you stay healthy. However, your care team may have specific advice just for you. Please talk to your care team about your preventive care needs.  Nutrition  Eat 5 or more servings of fruits and vegetables each day.  Try wheat bread, brown rice and whole grain pasta (instead of white bread, rice, and pasta).  Get enough calcium and vitamin D. Check the label on foods and aim for 100% of the RDA (recommended daily allowance).  Lifestyle  Exercise at least 150 minutes each week  (30 minutes a day, 5 days a week).  Do muscle strengthening activities 2 days a week. These help control your weight and prevent disease.  No smoking.  Wear sunscreen to prevent skin cancer.  Have a dental exam and cleaning every 6 months.  Yearly exams  See your health care team every year to talk about:  Any changes in your health.  Any medicines your care team has prescribed.  Preventive care, family planning, and ways to prevent chronic diseases.  Shots (vaccines)   HPV shots (up to age 26), if you've never had them before.  Hepatitis B shots (up to age 59), if you've never had them before.  COVID-19 shot: Get this shot when it's due.  Flu shot: Get a flu shot every year.  Tetanus shot: Get a tetanus shot every 10 years.  Pneumococcal, hepatitis A, and RSV shots: Ask your care team if you need these based on your risk.  Shingles shot (for age 50 and up)  General health tests  Diabetes screening:  Starting at age 35, Get screened for diabetes at least every 3 years.  If you are younger than age 35, ask your care team if you should be screened for diabetes.  Cholesterol test: At age 39, start having a cholesterol test every 5 years, or more often if advised.  Bone density scan (DEXA): At age 50, ask your care team if you should have this scan for osteoporosis (brittle bones).  Hepatitis C: Get tested at least once in your life.  STIs (sexually  transmitted infections)  Before age 24: Ask your care team if you should be screened for STIs.  After age 24: Get screened for STIs if you're at risk. You are at risk for STIs (including HIV) if:  You are sexually active with more than one person.  You don't use condoms every time.  You or a partner was diagnosed with a sexually transmitted infection.  If you are at risk for HIV, ask about PrEP medicine to prevent HIV.  Get tested for HIV at least once in your life, whether you are at risk for HIV or not.  Cancer screening tests  Cervical cancer screening: If you have a cervix, begin getting regular cervical cancer screening tests starting at age 21.  Breast cancer scan (mammogram): If you've ever had breasts, begin having regular mammograms starting at age 40. This is a scan to check for breast cancer.  Colon cancer screening: It is important to start screening for colon cancer at age 45.  Have a colonoscopy test every 10 years (or more often if you're at risk) Or, ask your provider about stool tests like a FIT test every year or Cologuard test every 3 years.  To learn more about your testing options, visit:   .  For help making a decision, visit:   https://bit.ly/nq56258.  Prostate cancer screening test: If you have a prostate, ask your care team if a prostate cancer screening test (PSA) at age 55 is right for you.  Lung cancer screening: If you are a current or former smoker ages 50 to 80, ask your care team if ongoing lung cancer screenings are right for you.  For informational purposes only. Not to replace the advice of your health care provider. Copyright   2023 [facility]. All rights reserved. Clinically reviewed by the [employer] [facility]. QCoefficient 595168 - REV 01/24.  Hearing Loss: Care Instructions  Overview     Hearing loss is a sudden or slow decrease in how well you hear. It can range from slight to profound. Permanent hearing loss can occur with aging. It also can  happen when you are exposed long-term to loud noise. Examples include listening to loud music, riding motorcycles, or being around other loud machines.  Hearing loss can affect your work and home life. It can make you feel lonely or depressed. You may feel that you have lost your independence. But hearing aids and other devices can help you hear better and feel connected to others.  Follow-up care is a key part of your treatment and safety. Be sure to make and go to all appointments, and call your doctor if you are having problems. It's also a good idea to know your test results and keep a list of the medicines you take.  How can you care for yourself at home?  Avoid loud noises whenever possible. This helps keep your hearing from getting worse.  Always wear hearing protection around loud noises.  Wear a hearing aid as directed.  A professional can help you pick a hearing aid that will work best for you.  You can also get hearing aids over the counter for mild to moderate hearing loss.  Have hearing tests as your doctor suggests. They can show whether your hearing has changed. Your hearing aid may need to be adjusted.  Use other devices as needed. These may include:  Telephone amplifiers and hearing aids that can connect to a television, stereo, radio, or microphone.  Devices that use lights or vibrations. These alert you to the doorbell, a ringing telephone, or a baby monitor.  Television closed-captioning. This shows the words at the bottom of the screen. Most new TVs can do this.  TTY (text telephone). This lets you type messages back and forth on the telephone instead of talking or listening. These devices are also called TDD. When messages are typed on the keyboard, they are sent over the phone line to a receiving TTY. The message is shown on a monitor.  Use text messaging, social media, and email if it is hard for you to communicate by telephone.  Try to learn a listening technique called speechreading. It is  "not lipreading. You pay attention to people's gestures, expressions, posture, and tone of voice. These clues can help you understand what a person is saying. Face the person you are talking to, and have them face you. Make sure the lighting is good. You need to see the other person's face clearly.  Think about counseling if you need help to adjust to your hearing loss.  When should you call for help?  Watch closely for changes in your health, and be sure to contact your doctor if:    You think your hearing is getting worse.     You have new symptoms, such as dizziness or nausea.   Where can you learn more?  Go to https://www.[employer].net/patiented  Enter R798 in the search box to learn more about \"Hearing Loss: Care Instructions.\"  Current as of: September 27, 2023               Content Version: 14.0    8876-5763 [employer].   Care instructions adapted under license by your healthcare professional. If you have questions about a medical condition or this instruction, always ask your healthcare professional. [employer] disclaims any warranty or liability for your use of this information.      Bladder Training: Care Instructions  Your Care Instructions     Bladder training is used to treat urge incontinence and stress incontinence. Urge incontinence means that the need to urinate comes on so fast that you can't get to a toilet in time. Stress incontinence means that you leak urine because of pressure on your bladder. For example, it may happen when you laugh, cough, or lift something heavy.  Bladder training can increase how long you can wait before you have to urinate. It can also help your bladder hold more urine. And it can give you better control over the urge to urinate.  It is important to remember that bladder training takes a few weeks to a few months to make a difference. You may not see results right away, but don't give up.  Follow-up care is a key part of your treatment and " safety. Be sure to make and go to all appointments, and call your doctor if you are having problems. It's also a good idea to know your test results and keep a list of the medicines you take.  How can you care for yourself at home?  Work with your doctor to come up with a bladder training program that is right for you. You may use one or more of the following methods.  Delayed urination  In the beginning, try to keep from urinating for 5 minutes after you first feel the need to go.  While you wait, take deep, slow breaths to relax. Kegel exercises can also help you delay the need to go to the bathroom.  After some practice, when you can easily wait 5 minutes to urinate, try to wait 10 minutes before you urinate.  Slowly increase the waiting period until you are able to control when you have to urinate.  Scheduled urination  Empty your bladder when you first wake up in the morning.  Schedule times throughout the day when you will urinate.  Start by going to the bathroom every hour, even if you don't need to go.  Slowly increase the time between trips to the bathroom.  When you have found a schedule that works well for you, keep doing it.  If you wake up during the night and have to urinate, do it. Apply your schedule to waking hours only.  Kegel exercises  These tighten and strengthen pelvic muscles, which can help you control the flow of urine. (If doing these exercises causes pain, stop doing them and talk with your doctor.) To do Kegel exercises:  Squeeze your muscles as if you were trying not to pass gas. Or squeeze your muscles as if you were stopping the flow of urine. Your belly, legs, and buttocks shouldn't move.  Hold the squeeze for 3 seconds, then relax for 5 to 10 seconds.  Start with 3 seconds, then add 1 second each week until you are able to squeeze for 10 seconds.  Repeat the exercise 10 times a session. Do 3 to 8 sessions a day.  When should you call for help?  Watch closely for changes in your  "health, and be sure to contact your doctor if:    Your incontinence is getting worse.     You do not get better as expected.   Where can you learn more?  Go to https://www.[employer].net/patiented  Enter V684 in the search box to learn more about \"Bladder Training: Care Instructions.\"  Current as of: November 15, 2023               Content Version: 14.0    7561-7041 [employer].   Care instructions adapted under license by your healthcare professional. If you have questions about a medical condition or this instruction, always ask your healthcare professional. [employer] disclaims any warranty or liability for your use of this information.         "

## 2024-09-10 NOTE — PROGRESS NOTES
Preventive Care Visit  [facility] AND [facility]  ALVERTO Haines CNP, Nurse Practitioner - Family  Sep 10, 2024      Assessment & Plan   Problem List Items Addressed This Visit          Endocrine    Mixed hyperlipidemia       Musculoskeletal and Integumentary    Primary osteoarthritis involving multiple joints    Relevant Medications    vitamin D3 (CHOLECALCIFEROL) 10 MCG (400 UNIT) capsule    calcium citrate (CITRACAL) 950 (200 Ca) MG tablet       Urinary    Urinary incontinence, unspecified type       Behavioral    Recurrent major depressive disorder, in full remission (H24)     Other Visit Diagnoses       Encounter for Medicare annual wellness exam    -  Primary    Relevant Orders    Comprehensive Metabolic Panel (Completed)    Encounter for screening mammogram for breast cancer        Relevant Orders    MA Screen Bilateral w/Aaron    Colon cancer screening        Relevant Orders    Colonoscopy Screening  Referral    Lipid screening        Relevant Orders    Lipid Panel (Completed)    Anxiety            Previous mixed hyperlipidemia, previous ASCVD risk score 6.5%.  Lipid panel updated today, will follow-up with results completed  Osteoarthritis, taking calcium and vitamin D, tolerating well, buys it over-the-counter  Urinary incontinence, this is continuing to be managed symptomatically, no medications, no concerns today  CMP updated today, results pending, will follow-up when completed  Mammogram ordered  Colonoscopy ordered  Discussed immunizations, she will consider influenza, RSV, shingles and COVID, if desired will get these through local pharmacy or injection nurse.  Anxiety is much better controlled with current medication, she will follow-up as needed with any further concerns.    Patient has been advised of split billing requirements and indicates understanding: Yes       BMI  Estimated body mass index is 27.07 kg/m  as calculated from the following:    Height as of this encounter:  "1.588 m (5' 2.5\").    Weight as of this encounter: 68.2 kg (150 lb 6 oz).   Weight management plan: Discussed healthy diet and exercise guidelines    Counseling  Appropriate preventive services were addressed with this patient via screening, questionnaire, or discussion as appropriate for fall prevention, nutrition, physical activity, Tobacco-use cessation, social engagement, weight loss and cognition.  Checklist reviewing preventive services available has been given to the patient.  Reviewed patient's diet, addressing concerns and/or questions.   She is at risk for lack of exercise and has been provided with information to increase physical activity for the benefit of her well-being.   The patient was instructed to see the dentist every 6 months.   The patient was provided with written information regarding signs of hearing loss.   Information on urinary incontinence and treatment options given to patient.         No follow-ups on file.      Samantha Butler is a 64 year old, presenting for the following:  Medicare Visit (Annual wellness exam)          Health Care Directive  Patient does not have a Health Care Directive or Living Will: Discussed advance care planning with patient; information given to patient to review.    HPI  Comes in for annual exam.     Feels depression anxiety symptoms are much improved with the increase in fluoxetine, tolerating medication well.  Continues with osteoarthritis, managing symptomatically.      9/10/2024   General Health   How would you rate your overall physical health? (!) FAIR   Feel stress (tense, anxious, or unable to sleep) To some extent            9/10/2024   Nutrition   Diet: Regular (no restrictions)            9/10/2024   Exercise   Days per week of moderate/strenous exercise 2 days   Average minutes spent exercising at this level 20 min            9/10/2024   Social Factors   Frequency of gathering with friends or relatives Patient declined   Worry food won't last " until get money to buy more No   Food not last or not have enough money for food? No   Do you have housing? (Housing is defined as stable permanent housing and does not include staying ouside in a car, in a tent, in an abandoned building, in an overnight shelter, or couch-surfing.) Yes   Are you worried about losing your housing? No   Lack of transportation? No   Unable to get utilities (heat,electricity)? No            9/10/2024   Activities of Daily Living- Home Safety   Needs help with the following daily activites None of the above   Safety concerns in the home None of the above            9/10/2024   Dental   Dentist two times every year? (!) NO            8/31/2023   Hearing Screening   Hearing concerns? Need to ask people to speak up or repeat themselves    Find that men's voices are easier to understand than woman's    Difficulty understanding soft or whispered speech       Multiple values from one day are sorted in reverse-chronological order           9/10/2024   Urinary Incontinence Screening   Bothered by leaking urine in past 6 months Yes            9/10/2024   TB Screening   Were you born outside of the [place]? No          Today's PHQ-9 Score:       9/10/2024     8:56 AM   PHQ-9 SCORE   PHQ-9 Total Score MyChart 4 (Minimal depression)   PHQ-9 Total Score 4         2/26/2024     8:45 AM 8/8/2024     8:24 AM 9/10/2024     8:56 AM   PHQ   PHQ-9 Total Score 4 4 4   Q9: Thoughts of better off dead/self-harm past 2 weeks Not at all Not at all Not at all         2/26/2024     8:46 AM 8/8/2024     8:17 AM 9/10/2024     8:57 AM   PAPO-7 SCORE   Total Score 6 (mild anxiety) 9 (mild anxiety) 5 (mild anxiety)   Total Score 6 9 5             9/10/2024   Substance Use   Alcohol more than 3/day or more than 7/wk No   Do you have a current opioid prescription? No   How severe/bad is pain from 1 to 10? 1/10   Do you use any other substances recreationally? No        Social History     Tobacco Use    Smoking status: Never     Smokeless tobacco: Never   Vaping Use    Vaping status: Never Used   Substance Use Topics    Alcohol use: No    Drug use: Never     Comment: Drug use: No           10/5/2023   LAST FHS-7 RESULTS   1st degree relative breast or ovarian cancer No   Any relative bilateral breast cancer No   Any male have breast cancer No   Any ONE woman have BOTH breast AND ovarian cancer No   Any woman with breast cancer before 50yrs No   2 or more relatives with breast AND/OR ovarian cancer No   2 or more relatives with breast AND/OR bowel cancer No           Mammogram Screening - Mammogram every 1-2 years updated in Health Maintenance based on mutual decision making      History of abnormal Pap smear: No - age 65 or older with adequate negative prior screening test results (3 consecutive negative cytology results, 2 consecutive negative cotesting results, or 2 consecutive negative HrHPV test results within 10 years, with the most recent test occurring within the recommended screening interval for the test used)       ASCVD Risk   The 10-year ASCVD risk score (Brian RUEDA, et al., 2019) is: 6.9%    Values used to calculate the score:      Age: 64 years      Sex: Female      Is Non- : No      Diabetic: No      Tobacco smoker: No      Systolic Blood Pressure: 126 mmHg      Is BP treated: No      HDL Cholesterol: 52 mg/dL      Total Cholesterol: 254 mg/dL            Reviewed and updated as needed this visit by Provider   Tobacco  Allergies  Meds  Problems  Med Hx  Surg Hx  Fam Hx            Past Medical History:   Diagnosis Date    Anemia     No Comments Provided    Excessive and frequent menstruation with regular cycle     No Comments Provided    Major depressive disorder, single episode     History of depression    Personal history of other medical treatment (CODE)     age 14,for mono     Past Surgical History:   Procedure Laterality Date    COLONOSCOPY  07/24/2014 7/24/2014,10 year follow up     HYSTERECTOMY TOTAL ABDOMINAL          LAPAROSCOPIC CHOLECYSTECTOMY          OTHER SURGICAL HISTORY      3/6/12,[id number],AMPUTATION,revision of left index finger, Dr Cortez     BP Readings from Last 3 Encounters:   09/10/24 126/74   24 136/80   24 120/70    Wt Readings from Last 3 Encounters:   09/10/24 68.2 kg (150 lb 6 oz)   24 69.7 kg (153 lb 9.6 oz)   24 70.3 kg (155 lb)                  Patient Active Problem List   Diagnosis    Anemia    Depression    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), 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    Hyperlipidemia LDL goal <100    PVC's (premature ventricular contractions)    Mixed hyperlipidemia    Urinary incontinence, unspecified type    Recurrent major depressive disorder, in full remission (H24)    Primary osteoarthritis involving multiple joints    Primary osteoarthritis of both hands     Past Surgical History:   Procedure Laterality Date    COLONOSCOPY  2014,10 year follow up    HYSTERECTOMY TOTAL ABDOMINAL          LAPAROSCOPIC CHOLECYSTECTOMY          OTHER SURGICAL HISTORY      3/6/12,[id number],AMPUTATION,revision of left index finger, Dr Cortez       Social History     Tobacco Use    Smoking status: Never    Smokeless tobacco: Never   Substance Use Topics    Alcohol use: No     Family History   Problem Relation Age of Onset    Substance Abuse Mother         Alcohol/Drug,Problems with alcohol abuse    Heart Disease Father         Heart Disease,CAD, MI, did smoke    Diabetes Father         Diabetes    Hypertension Father         Hypertension    Hypertension Sister         Hypertension    Substance Abuse Sister     Bronchitis Sister     Heart Disease Sister     Hypertension Sister         Hypertension    Hypertension Brother     Hypertension Brother     Hypertension Brother     Cancer Brother     Breast Cancer Maternal Aunt         Cancer-breast    Cancer Maternal Aunt         Cancer, of  pharyngeal cancer age 68    Other - See Comments Other         Psychiatric illness,Depression, probably committed suicide    Heart Disease Other         Heart Disease,MI    Other - See Comments Other         smoked    Diabetes Other         Diabetes    Heart Disease Other         Heart Disease,CAD    Heart Disease Other         Heart Disease,CAD    Cancer Other         Cancer,Testicular cancer.         Current Outpatient Medications   Medication Sig Dispense Refill    ASPIRIN LOW DOSE 81 MG EC tablet TAKE 1 TABLET BY MOUTH EVERY  tablet 3    calcium citrate (CITRACAL) 950 (200 Ca) MG tablet Take 2 tablets by mouth daily as needed.      FLUoxetine (PROZAC) 40 MG capsule Take 1 capsule (40 mg) by mouth daily 90 capsule 4    vitamin D3 (CHOLECALCIFEROL) 10 MCG (400 UNIT) capsule Take 1 capsule by mouth daily as needed.       Allergies   Allergen Reactions    Amoxicillin Rash     Current providers sharing in care for this patient include:  Patient Care Team:  Lakisha De La Cruz APRN CNP as PCP - General (Nurse Practitioner - Family)  Lakisha De La Cruz APRN CNP as Assigned PCP  Jam Knight MD as Assigned Neuroscience Provider  Raghav Prescott MD as Assigned Musculoskeletal Provider    The following health maintenance items are reviewed in Epic and correct as of today:  Health Maintenance   Topic Date Due    ZOSTER IMMUNIZATION (1 of 2) Never done    RSV VACCINE (1 - Risk 60-74 years 1-dose series) Never done    INFLUENZA VACCINE (1) 09/01/2024    COVID-19 Vaccine (3 - 2023-24 season) 09/01/2024    COLORECTAL CANCER SCREENING  07/24/2024    PHQ-9  03/10/2025    MEDICARE ANNUAL WELLNESS VISIT  09/10/2025    LIPID  09/10/2025    FALL RISK ASSESSMENT  09/10/2025    MAMMO SCREENING  10/12/2025    GLUCOSE  09/10/2027    DTAP/TDAP/TD IMMUNIZATION (2 - Td or Tdap) 03/26/2028    ADVANCE CARE PLANNING  09/10/2029    DEXA  10/11/2038    DEPRESSION ACTION PLAN  Completed    Pneumococcal Vaccine: 65+ Years  Completed     "HPV IMMUNIZATION  Aged Out    MENINGITIS IMMUNIZATION  Aged Out    RSV MONOCLONAL ANTIBODY  Aged Out    HEPATITIS C SCREENING  Discontinued            Objective    Exam  /74 (BP Location: Right arm, Patient Position: Sitting, Cuff Size: Adult Regular)   Pulse 66   Temp 98.3  F (36.8  C) (Tympanic)   Resp 20   Ht 1.588 m (5' 2.5\")   Wt 68.2 kg (150 lb 6 oz)   LMP  (LMP Unknown)   SpO2 97%   BMI 27.07 kg/m     Estimated body mass index is 27.07 kg/m  as calculated from the following:    Height as of this encounter: 1.588 m (5' 2.5\").    Weight as of this encounter: 68.2 kg (150 lb 6 oz).    Physical Exam  GENERAL: alert and no distress  EYES: Eyes grossly normal to inspection, PERRL and conjunctivae and sclerae normal  HENT: ear canals and TM's normal, nose and mouth without ulcers or lesions  NECK: no adenopathy, no asymmetry, masses, or scars  RESP: lungs clear to auscultation - no rales, rhonchi or wheezes  CV: regular rate and rhythm, normal S1 S2, no S3 or S4, no murmur, click or rub, no peripheral edema  ABDOMEN: soft, nontender, no hepatosplenomegaly, no masses and bowel sounds normal  MS: no gross musculoskeletal defects noted, no edema  SKIN: no suspicious lesions or rashes  NEURO: Normal strength and tone, mentation intact and speech normal  PSYCH: mentation appears normal, affect normal/bright        9/10/2024   Mini Cog   Clock Draw Score 2 Normal   3 Item Recall 3 objects recalled   Mini Cog Total Score 5                 Signed Electronically by: ALVERTO Haines CNP  Answers submitted by the patient for this visit:  Patient Health Questionnaire (Submitted on 9/10/2024)  If you checked off any problems, how difficult have these problems made it for you to do your work, take care of things at home, or get along with other people?: Somewhat difficult  PHQ9 TOTAL SCORE: 4  Patient Health Questionnaire (G7) (Submitted on 9/10/2024)  PAPO 7 TOTAL SCORE: 5    "

## 2024-09-10 NOTE — NURSING NOTE
"Chief Complaint   Patient presents with    Medicare Visit     Annual wellness exam       Initial /74 (BP Location: Right arm, Patient Position: Sitting, Cuff Size: Adult Regular)   Pulse 66   Temp 98.3  F (36.8  C) (Tympanic)   Resp 20   Ht 1.588 m (5' 2.5\")   Wt 68.2 kg (150 lb 6 oz)   LMP  (LMP Unknown)   SpO2 97%   BMI 27.07 kg/m   Estimated body mass index is 27.07 kg/m  as calculated from the following:    Height as of this encounter: 1.588 m (5' 2.5\").    Weight as of this encounter: 68.2 kg (150 lb 6 oz).  Medication Review: complete    The next two questions are to help us understand your food security.  If you are feeling you need any assistance in this area, we have resources available to support you today.          1/15/2024   SDOH- Food Insecurity   Within the past 12 months, did you worry that your food would run out before you got money to buy more? N   Within the past 12 months, did the food you bought just not last and you didn t have money to get more? N            Health Care Directive:  Patient does not have a Health Care Directive or Living Will: Discussed advance care planning with patient; information given to patient to review.    Kourtney Durand LPN      "

## 2024-09-11 DIAGNOSIS — Z12.11 ENCOUNTER FOR SCREENING COLONOSCOPY: Primary | ICD-10-CM

## 2024-09-11 NOTE — TELEPHONE ENCOUNTER
Screening Questions for the Scheduling of Screening Colonoscopies   (If Colonoscopy is diagnostic, Provider should review the chart before scheduling.)  Are you younger than 45 or older than 80?  No  Do you take aspirin or fish oil?  No (if yes, tell patient to stop 1 week prior to Colonoscopy)  Do you take warfarin (Coumadin), clopidogrel (Plavix), apixaban (Eliquis), dabigatram (Pradaxa), rivaroxaban (Xarelto) or any blood thinner? No  Do you take semaglutide (Ozempic or Wegovy), tirzepatide (Mounjaro or Zepbound), liraglutide (Victoza), or dulaglutide (Trulicity)? No  Do you use oxygen or a CPAP at home?  No  Do you have kidney disease? No  Are you on dialysis? No  Have you had a stroke or heart attack in the last year? No  Have you had a stent in your heart or any blood vessel in the last year? No  Have you had a transplant of any organ? No  Have you had a colonoscopy or upper endoscopy (EGD) before? Yes         When?  10 years ago  Date of scheduled Colonoscopy:  11.21.2024  Provider:  Jono Meza [facility]  Liana Gomez on 9/11/2024 at 12:54 PM

## 2024-09-12 RX ORDER — BISACODYL 5 MG/1
TABLET, DELAYED RELEASE ORAL
Qty: 2 TABLET | Refills: 0 | Status: SHIPPED | OUTPATIENT
Start: 2024-11-14

## 2024-09-12 RX ORDER — POLYETHYLENE GLYCOL 3350, SODIUM CHLORIDE, SODIUM BICARBONATE, POTASSIUM CHLORIDE 420; 11.2; 5.72; 1.48 G/4L; G/4L; G/4L; G/4L
4000 POWDER, FOR SOLUTION ORAL ONCE
Qty: 4000 ML | Refills: 0 | Status: SHIPPED | OUTPATIENT
Start: 2024-11-14 | End: 2024-11-14

## 2024-10-21 ENCOUNTER — HOSPITAL ENCOUNTER (OUTPATIENT)
Dept: MAMMOGRAPHY | Facility: OTHER | Age: 64
Discharge: HOME | End: 2024-10-21
Attending: NURSE PRACTITIONER | Admitting: NURSE PRACTITIONER
Payer: MEDICARE

## 2024-10-21 DIAGNOSIS — Z12.31 ENCOUNTER FOR SCREENING MAMMOGRAM FOR BREAST CANCER: ICD-10-CM

## 2024-10-21 PROCEDURE — 77063 BREAST TOMOSYNTHESIS BI: CPT

## 2024-11-12 ENCOUNTER — HOSPITAL ENCOUNTER (EMERGENCY)
Facility: OTHER | Age: 64
Discharge: HOME | End: 2024-11-12
Attending: FAMILY MEDICINE
Payer: MEDICARE

## 2024-11-12 VITALS
HEIGHT: 63 IN | HEART RATE: 58 BPM | OXYGEN SATURATION: 98 % | TEMPERATURE: 97.7 F | BODY MASS INDEX: 26.58 KG/M2 | RESPIRATION RATE: 16 BRPM | WEIGHT: 150 LBS | DIASTOLIC BLOOD PRESSURE: 66 MMHG | SYSTOLIC BLOOD PRESSURE: 123 MMHG

## 2024-11-12 DIAGNOSIS — M54.50 ACUTE LOW BACK PAIN, UNSPECIFIED BACK PAIN LATERALITY, UNSPECIFIED WHETHER SCIATICA PRESENT: Primary | ICD-10-CM

## 2024-11-12 PROCEDURE — 99282 EMERGENCY DEPT VISIT SF MDM: CPT | Performed by: FAMILY MEDICINE

## 2024-11-12 ASSESSMENT — COLUMBIA-SUICIDE SEVERITY RATING SCALE - C-SSRS
1. IN THE PAST MONTH, HAVE YOU WISHED YOU WERE DEAD OR WISHED YOU COULD GO TO SLEEP AND NOT WAKE UP?: NO
6. HAVE YOU EVER DONE ANYTHING, STARTED TO DO ANYTHING, OR PREPARED TO DO ANYTHING TO END YOUR LIFE?: NO
2. HAVE YOU ACTUALLY HAD ANY THOUGHTS OF KILLING YOURSELF IN THE PAST MONTH?: NO

## 2024-11-12 NOTE — ED TRIAGE NOTES
"Patient arrives from home with  with complaints of lower left back pain. 9/10 sharp, constant pain. Pain woke patient up this morning around 0230. Tried heat/ice with no relief. Hydrocodone taken around 0430 this morning.  /66   Pulse 58   Temp 97.7  F (36.5  C) (Tympanic)   Resp 16   Ht 1.588 m (5' 2.5\")   Wt 68 kg (150 lb)   LMP  (LMP Unknown)   SpO2 98%   BMI 27.00 kg/m    Denise Bianchi RN on 11/12/2024 at 8:11 AM       Triage Assessment (Adult)       Row Name 11/12/24 0808          Triage Assessment    Airway WDL WDL        Respiratory WDL    Respiratory WDL WDL        Skin Circulation/Temperature WDL    Skin Circulation/Temperature WDL WDL        Cardiac WDL    Cardiac WDL WDL        Peripheral/Neurovascular WDL    Peripheral Neurovascular WDL WDL        Cognitive/Neuro/Behavioral WDL    Cognitive/Neuro/Behavioral WDL WDL                     "

## 2024-11-12 NOTE — H&P (VIEW-ONLY)
"[facility]  Emergency Department Visit Note    Back Pain      History of Present Illness     HPI:  Josie Wilson is a 64 year old female presenting with left-sided buttock pain coming from her low back.  She woke up with this acutely at 2:30 AM this morning while laying in bed.  She was able to ambulate but it was difficult getting out of bed.  It was a sharp pain.  She tried Tylenol ice and heat without relief.  She then took one of her husbands hydrocodone tablets without relief.  It has improved now.  She denies any difficulty controlling her bowels or bladder.  No weakness or numbness in the extremities.  She has been to physical therapy in the past and had stretches to do in the past.  She tried some the stretches and they were painful.  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  This is not a usual activity for her.  She has no regular exercise or core strengthening routine.  She and her  do cut and split wood regularly.  She denies any pain shooting down her legs or into her foot.  No recent unexplained weight loss or night sweats.      Review of Systems:  10 point review of systems obtained and pertinent positive and negative findings noted in HPI. Review of systems otherwise negative.  Medications:  Reviewed in Epic Allergies:  Reviewed in Epic Problem List:  Reviewed in Epic   Past Medical History:  Reviewed in Epic Past Surgical History:  Reviewed in Epic Social History:  Reviewed in Epic       Physical Exam   Vital signs: /66   Pulse 58   Temp 97.7  F (36.5  C) (Tympanic)   Resp 16   Ht 1.588 m (5' 2.5\")   Wt 68 kg (150 lb)   LMP  (LMP Unknown)   SpO2 98%   BMI 27.00 kg/m    Physical Exam  Constitutional:       General: She is not in acute distress.     Appearance: Normal appearance.   Cardiovascular:      Rate and Rhythm: Normal rate.   Pulmonary:      Effort: Pulmonary effort is normal. "   Musculoskeletal:      Lumbar back: No swelling, deformity, spasms, tenderness or bony tenderness. Decreased range of motion. Negative right straight leg raise test and negative left straight leg raise test.      Comments: Mild pain with left-sided lumbar flexion and some limitation due to pain.  Strength in all muscle groups in the lower extremities is 5 out of 5.  DTRs at the patella and Achilles are 2+ and symmetric.  Sensation groceries.   Skin:     General: Skin is warm and dry.   Neurological:      Mental Status: She is alert and oriented to person, place, and time.   Psychiatric:         Mood and Affect: Mood normal.         Behavior: Behavior normal.                No results found for this or any previous visit (from the past 24 hours).    Medications - No data to display    Medical Decision Making     Josie Wilson is a 64 year old female presenting with left-sided low back and buttock pain.  No red flag signs or symptoms.  I recommended physical therapy and acupuncture in addition to ice and heat as tolerated.  She declined a PT referral.  I also recommended when she is feeling better to start on a core strengthening routine like dany chi.  Of course if she is to develop neurologic symptoms or worsening pain or debilitation she should return to the emergency department or follow-up with her primary care provider.   Patient given instructions on follow-up and warning signs for which to return to ED. All questions were answered and the patient is comfortable with plan for discharge. The patient was discharged in stable condition or transferred in as stable condition as possible.    I have reviewed the patient's Medical Imaging and Medical Records.  I have reviewed the nursing notes.  I have reviewed the findings, diagnosis, plan and need for follow up with the patient.    Diagnosis & Disposition     Diagnosis:  1. Acute low back pain, unspecified back pain laterality, unspecified whether sciatica  present        Discharge disposition:  Discharged to home       Follow-up: Follow up with primary care provider in as needed we discussed physical therapy but she did decline.  Days       [facility] Emergency Department  Melissa Charles MD  Family Medicine     Melissa Charles MD  11/12/24 [id number]

## 2024-11-12 NOTE — DISCHARGE INSTRUCTIONS
Consider physical therapy and accupuncture if your home rehab isn't helpful.  If you develop neurologic symptoms or worsening symptoms please return to the ER.

## 2024-11-12 NOTE — ED PROVIDER NOTES
"[facility]  Emergency Department Visit Note    Back Pain      History of Present Illness     HPI:  Joise Wilson is a 64 year old female presenting with left-sided buttock pain coming from her low back.  She woke up with this acutely at 2:30 AM this morning while laying in bed.  She was able to ambulate but it was difficult getting out of bed.  It was a sharp pain.  She tried Tylenol ice and heat without relief.  She then took one of her husbands hydrocodone tablets without relief.  It has improved now.  She denies any difficulty controlling her bowels or bladder.  No weakness or numbness in the extremities.  She has been to physical therapy in the past and had stretches to do in the past.  She tried some the stretches and they were painful.  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  This is not a usual activity for her.  She has no regular exercise or core strengthening routine.  She and her  do cut and split wood regularly.  She denies any pain shooting down her legs or into her foot.  No recent unexplained weight loss or night sweats.      Review of Systems:  10 point review of systems obtained and pertinent positive and negative findings noted in HPI. Review of systems otherwise negative.  Medications:  Reviewed in Epic Allergies:  Reviewed in Epic Problem List:  Reviewed in Epic   Past Medical History:  Reviewed in Epic Past Surgical History:  Reviewed in Epic Social History:  Reviewed in Epic       Physical Exam   Vital signs: /66   Pulse 58   Temp 97.7  F (36.5  C) (Tympanic)   Resp 16   Ht 1.588 m (5' 2.5\")   Wt 68 kg (150 lb)   LMP  (LMP Unknown)   SpO2 98%   BMI 27.00 kg/m    Physical Exam  Constitutional:       General: She is not in acute distress.     Appearance: Normal appearance.   Cardiovascular:      Rate and Rhythm: Normal rate.   Pulmonary:      Effort: Pulmonary effort is normal. "   Musculoskeletal:      Lumbar back: No swelling, deformity, spasms, tenderness or bony tenderness. Decreased range of motion. Negative right straight leg raise test and negative left straight leg raise test.      Comments: Mild pain with left-sided lumbar flexion and some limitation due to pain.  Strength in all muscle groups in the lower extremities is 5 out of 5.  DTRs at the patella and Achilles are 2+ and symmetric.  Sensation groceries.   Skin:     General: Skin is warm and dry.   Neurological:      Mental Status: She is alert and oriented to person, place, and time.   Psychiatric:         Mood and Affect: Mood normal.         Behavior: Behavior normal.                No results found for this or any previous visit (from the past 24 hours).    Medications - No data to display    Medical Decision Making     Josie Wilson is a 64 year old female presenting with left-sided low back and buttock pain.  No red flag signs or symptoms.  I recommended physical therapy and acupuncture in addition to ice and heat as tolerated.  She declined a PT referral.  I also recommended when she is feeling better to start on a core strengthening routine like dany chi.  Of course if she is to develop neurologic symptoms or worsening pain or debilitation she should return to the emergency department or follow-up with her primary care provider.   Patient given instructions on follow-up and warning signs for which to return to ED. All questions were answered and the patient is comfortable with plan for discharge. The patient was discharged in stable condition or transferred in as stable condition as possible.    I have reviewed the patient's Medical Imaging and Medical Records.  I have reviewed the nursing notes.  I have reviewed the findings, diagnosis, plan and need for follow up with the patient.    Diagnosis & Disposition     Diagnosis:  1. Acute low back pain, unspecified back pain laterality, unspecified whether sciatica  present        Discharge disposition:  Discharged to home       Follow-up: Follow up with primary care provider in as needed we discussed physical therapy but she did decline.  Days       [facility] Emergency Department  Melissa Charles MD  Family Medicine     Melissa Charles MD  11/12/24 [id number]

## 2024-11-21 ENCOUNTER — ANESTHESIA EVENT (OUTPATIENT)
Dept: SURGERY | Facility: OTHER | Age: 64
End: 2024-11-21
Payer: MEDICARE

## 2024-11-21 ENCOUNTER — ANESTHESIA (OUTPATIENT)
Dept: SURGERY | Facility: OTHER | Age: 64
End: 2024-11-21
Payer: MEDICARE

## 2024-11-21 ENCOUNTER — HOSPITAL ENCOUNTER (OUTPATIENT)
Facility: OTHER | Age: 64
Discharge: HOME | End: 2024-11-21
Attending: SURGERY | Admitting: SURGERY
Payer: MEDICARE

## 2024-11-21 VITALS
WEIGHT: 150 LBS | HEIGHT: 62 IN | OXYGEN SATURATION: 94 % | HEART RATE: 59 BPM | SYSTOLIC BLOOD PRESSURE: 105 MMHG | DIASTOLIC BLOOD PRESSURE: 78 MMHG | TEMPERATURE: 97.2 F | RESPIRATION RATE: 18 BRPM | BODY MASS INDEX: 27.6 KG/M2

## 2024-11-21 PROCEDURE — 45385 COLONOSCOPY W/LESION REMOVAL: CPT | Mod: PT | Performed by: SURGERY

## 2024-11-21 PROCEDURE — 88305 TISSUE EXAM BY PATHOLOGIST: CPT

## 2024-11-21 PROCEDURE — 45380 COLONOSCOPY AND BIOPSY: CPT | Performed by: SURGERY

## 2024-11-21 PROCEDURE — 258N000003 HC RX IP 258 OP 636: Performed by: SURGERY

## 2024-11-21 PROCEDURE — 250N000009 HC RX 250: Performed by: NURSE ANESTHETIST, CERTIFIED REGISTERED

## 2024-11-21 PROCEDURE — 250N000011 HC RX IP 250 OP 636: Performed by: NURSE ANESTHETIST, CERTIFIED REGISTERED

## 2024-11-21 PROCEDURE — 999N000010 HC STATISTIC ANES STAT CODE-CRNA PER MINUTE: Performed by: SURGERY

## 2024-11-21 RX ORDER — LIDOCAINE 40 MG/G
CREAM TOPICAL
Status: DISCONTINUED | OUTPATIENT
Start: 2024-11-21 | End: 2024-11-21 | Stop reason: HOSPADM

## 2024-11-21 RX ORDER — SODIUM CHLORIDE, SODIUM LACTATE, POTASSIUM CHLORIDE, CALCIUM CHLORIDE 600; 310; 30; 20 MG/100ML; MG/100ML; MG/100ML; MG/100ML
INJECTION, SOLUTION INTRAVENOUS CONTINUOUS
Status: DISCONTINUED | OUTPATIENT
Start: 2024-11-21 | End: 2024-11-21 | Stop reason: HOSPADM

## 2024-11-21 RX ORDER — PROPOFOL 10 MG/ML
INJECTION, EMULSION INTRAVENOUS CONTINUOUS PRN
Status: DISCONTINUED | OUTPATIENT
Start: 2024-11-21 | End: 2024-11-21

## 2024-11-21 RX ORDER — PROPOFOL 10 MG/ML
INJECTION, EMULSION INTRAVENOUS PRN
Status: DISCONTINUED | OUTPATIENT
Start: 2024-11-21 | End: 2024-11-21

## 2024-11-21 RX ORDER — LIDOCAINE HYDROCHLORIDE 20 MG/ML
INJECTION, SOLUTION INFILTRATION; PERINEURAL PRN
Status: DISCONTINUED | OUTPATIENT
Start: 2024-11-21 | End: 2024-11-21

## 2024-11-21 RX ADMIN — LIDOCAINE HYDROCHLORIDE 40 MG: 20 INJECTION, SOLUTION INFILTRATION; PERINEURAL at 12:14

## 2024-11-21 RX ADMIN — SODIUM CHLORIDE, POTASSIUM CHLORIDE, SODIUM LACTATE AND CALCIUM CHLORIDE: 600; 310; 30; 20 INJECTION, SOLUTION INTRAVENOUS at 10:58

## 2024-11-21 RX ADMIN — PROPOFOL 100 MG: 10 INJECTION, EMULSION INTRAVENOUS at 12:14

## 2024-11-21 RX ADMIN — PROPOFOL 150 MCG/KG/MIN: 10 INJECTION, EMULSION INTRAVENOUS at 12:14

## 2024-11-21 ASSESSMENT — ACTIVITIES OF DAILY LIVING (ADL)
ADLS_ACUITY_SCORE: 0

## 2024-11-21 NOTE — ANESTHESIA PREPROCEDURE EVALUATION
Anesthesia Pre-Procedure Evaluation    Patient: Josie Wilson   MRN: [id number] : 1960        Procedure : Procedure(s):  Colonoscopy          Past Medical History:   Diagnosis Date    Anemia     No Comments Provided    Excessive and frequent menstruation with regular cycle     No Comments Provided    Major depressive disorder, single episode     History of depression    Personal history of other medical treatment (CODE)     age 14,for mono      Past Surgical History:   Procedure Laterality Date    COLONOSCOPY  2014,10 year follow up    HYSTERECTOMY TOTAL ABDOMINAL          LAPAROSCOPIC CHOLECYSTECTOMY          OTHER SURGICAL HISTORY      3/6/12,[id number],AMPUTATION,revision of left index finger, Dr Cortez      Allergies   Allergen Reactions    Amoxicillin Rash      Social History     Tobacco Use    Smoking status: Never    Smokeless tobacco: Never   Substance Use Topics    Alcohol use: No      Wt Readings from Last 1 Encounters:   24 68 kg (150 lb)        Anesthesia Evaluation   Pt has had prior anesthetic.     No history of anesthetic complications       ROS/MED HX  ENT/Pulmonary:  - neg pulmonary ROS     Neurologic:  - neg neurologic ROS     Cardiovascular:     (+) Dyslipidemia - -   -  - -                                      METS/Exercise Tolerance: >4 METS    Hematologic:  - neg hematologic  ROS     Musculoskeletal:  - neg musculoskeletal ROS     GI/Hepatic:  - neg GI/hepatic ROS     Renal/Genitourinary:  - neg Renal ROS     Endo:  - neg endo ROS     Psychiatric/Substance Use:  - neg psychiatric ROS     Infectious Disease:  - neg infectious disease ROS     Malignancy:  - neg malignancy ROS     Other:  - neg other ROS          Physical Exam    Airway        Mallampati: I   TM distance: > 3 FB   Neck ROM: full   Mouth opening: > 3 cm    Respiratory Devices and Support         Dental       (+) Minor Abnormalities - some fillings, tiny chips      Cardiovascular    "cardiovascular exam normal       Rhythm and rate: regular and normal     Pulmonary   pulmonary exam normal        breath sounds clear to auscultation           OUTSIDE LABS:  CBC:   Lab Results   Component Value Date    WBC 8.5 05/19/2022    WBC 8.9 06/25/2020    HGB 13.6 05/19/2022    HGB 13.6 06/25/2020    HCT 42.0 05/19/2022    HCT 41.9 06/25/2020     05/19/2022     06/25/2020     BMP:   Lab Results   Component Value Date     09/10/2024     05/19/2022    POTASSIUM 4.2 09/10/2024    POTASSIUM 4.3 05/19/2022    CHLORIDE 103 09/10/2024    CHLORIDE 103 05/19/2022    CO2 28 09/10/2024    CO2 32 (H) 05/19/2022    BUN 15.4 09/10/2024    BUN 14 05/19/2022    CR 0.84 09/10/2024    CR 0.81 05/19/2022     (H) 09/10/2024    GLC 93 05/19/2022     COAGS: No results found for: \"PTT\", \"INR\", \"FIBR\"  POC: No results found for: \"BGM\", \"HCG\", \"HCGS\"  HEPATIC:   Lab Results   Component Value Date    ALBUMIN 4.6 09/10/2024    PROTTOTAL 7.5 09/10/2024    ALT 31 09/10/2024    AST 20 09/10/2024    ALKPHOS 102 09/10/2024    BILITOTAL 0.6 09/10/2024     OTHER:   Lab Results   Component Value Date    PRICILLA 9.6 09/10/2024       Anesthesia Plan    ASA Status:  2    NPO Status:  NPO Appropriate    Anesthesia Type: MAC.     - Reason for MAC: straight local not clinically adequate   Induction: Intravenous.           Consents    Anesthesia Plan(s) and associated risks, benefits, and realistic alternatives discussed. Questions answered and patient/representative(s) expressed understanding.     - Discussed: Risks, Benefits and Alternatives for BOTH SEDATION and the PROCEDURE were discussed     - Discussed with:  Patient            Postoperative Care            Comments:               ABIGAIL SWANN, ALVERTO CRNA    I have reviewed the pertinent notes and labs in the chart from the past 30 days and (re)examined the patient.  Any updates or changes from those notes are reflected in this note.             # Drug Induced " "Platelet Defect: home medication list includes an antiplatelet medication             # Overweight: Estimated body mass index is 27.44 kg/m  as calculated from the following:    Height as of this encounter: 1.575 m (5' 2\").    Weight as of this encounter: 68 kg (150 lb).             "

## 2024-11-21 NOTE — ANESTHESIA POSTPROCEDURE EVALUATION
Patient: Josie Wilson    Procedure: Procedure(s):  COLONOSCOPY, WITH POLYPECTOMY       Anesthesia Type:  MAC    Note:  Disposition: Outpatient   Postop Pain Control: Uneventful            Sign Out: Well controlled pain   PONV: No   Neuro/Psych: Uneventful            Sign Out: Acceptable/Baseline neuro status   Airway/Respiratory: Uneventful            Sign Out: Acceptable/Baseline resp. status   CV/Hemodynamics: Uneventful            Sign Out: Acceptable CV status; No obvious hypovolemia; No obvious fluid overload   Other NRE: NONE   DID A NON-ROUTINE EVENT OCCUR?            Last vitals:  Vitals Value Taken Time   BP     Temp     Pulse     Resp     SpO2         Electronically Signed By: ALVERTO Irvin CRNA  November 21, 2024  12:46 PM

## 2024-11-21 NOTE — OR NURSING
Carrie has been discharged to home at 1330 via ambulatory accompanied by spouse, Balbir.     Written discharge instructions were provided to patient and spouse.  Prescriptions were none.      Patient and adult caring for them verbalize understanding of discharge instructions including no driving until tomorrow and no longer taking narcotic pain medications - no operating mechanical equipment and no making any important decisions.They understand reason for discharge, and necessary follow-up appointments.

## 2024-11-21 NOTE — INTERVAL H&P NOTE
"I have reviewed the surgical (or preoperative) H&P that is linked to this encounter, and examined the patient. There are no significant changes    Clinical Conditions Present on Arrival:  Clinically Significant Risk Factors Present on Admission                  # Drug Induced Platelet Defect: home medication list includes an antiplatelet medication      # Overweight: Estimated body mass index is 27.44 kg/m  as calculated from the following:    Height as of this encounter: 1.575 m (5' 2\").    Weight as of this encounter: 68 kg (150 lb).       "

## 2024-11-21 NOTE — ANESTHESIA CARE TRANSFER NOTE
Patient: Josie Wilson    Procedure: Procedure(s):  COLONOSCOPY, WITH POLYPECTOMY       Diagnosis: Colon cancer screening [Z12.11]  Diagnosis Additional Information: No value filed.    Anesthesia Type:   MAC     Note:    Oropharynx: oropharynx clear of all foreign objects and spontaneously breathing  Level of Consciousness: drowsy  Oxygen Supplementation: room air    Independent Airway: airway patency satisfactory and stable  Dentition: dentition unchanged  Vital Signs Stable: post-procedure vital signs reviewed and stable  Report to RN Given: handoff report given  Patient transferred to: Phase II    Handoff Report: Identifed the Patient, Identified the Reponsible Provider, Reviewed the pertinent medical history, Discussed the surgical course, Reviewed Intra-OP anesthesia mangement and issues during anesthesia, Set expectations for post-procedure period and Allowed opportunity for questions and acknowledgement of understanding      Vitals:  Vitals Value Taken Time   BP     Temp     Pulse     Resp     SpO2         Electronically Signed By: ALVERTO Irvin CRNA  November 21, 2024  12:46 PM

## 2024-11-21 NOTE — OP NOTE
PROCEDURE NOTE    SURGEON:Teodoro De La Cruz MD    PRE-OP DIAGNOSIS:  Screening Colonoscopy      POST-OP DIAGNOSIS: Rectal Polyp    PROCEDURE:  Colonoscopy with cold snare    SPECIMEN:      ID Type Source Tests Collected by Time Destination   1 : RECTUM Polyp Rectum SURGICAL PATHOLOGY EXAM Teodoro De La Cruz MD 11/21/2024 12:34 PM        ANESTHESIA:  MAC CRNA Independent: Tra Horne APRN CRNA   Coverage requested    ESTIMATED BLOOD LOSS: none    COMPLICATIONS:  None    INDICATION FOR THE PROCEDURE: The patient is a 64 year old female. The patient presents with need for screening. I explained to the patient the risks, benefits and alternatives to screening colonoscopy for evaluating for cancer or polyps. We discussed the risks including bleeding, perforation, potential inability to reach the cecum and the risks of sedation. The patient's questions were answered and the patient wished to proceed. Informed consent paperwork was completed.    PROCEDURE: The patient was taken to the endoscopy suite. Appropriate monitors were attached. The patient was placed in the left lateral decubitus position. Timeout was performed confirming the patient's identity and procedure to be performed.  After appropriate sedation was confirmed, digital rectal exam was performed.  There was normal tone and no gross abnormality was noted.  The lubricated colonoscope was introduced into the anus the colon was insufflated with air. The prep quality was adequate. Under direct visualization the scope was advanced to the cecum. The mucosa of the colon was inspected while withdrawing the scope.  A 5 mm pedunculated polyp with inflammation was removed from the rectum.  The scope was retroflexed in the rectum and the anorectal junction was inspected. No abnormalities were noted. The scope was returned to a neutral position and the colon was decompressed. The scope was removed. The patient tolerated the procedure with no immediately apparent  complication. The patient was taken to recovery in stable condition.    FOLLOW UP: RECOMMEND high fiber diet, will call with pathology results.     Teodoro De La Cruz MD on 11/21/2024 at 12:47 PM

## 2024-11-21 NOTE — DISCHARGE INSTRUCTIONS
.[facility]  Adult Discharge Orders & Instructions    ________________________________________________________________          For 12 hours after surgery  Get plenty of rest.  A responsible adult must stay with you for at least 12 hours after you leave the hospital.   You may feel lightheaded.  IF so, sit for a few minutes before standing.  Have someone help you get up.   You may have a slight fever. Call the doctor if your fever is over 101 F (38.3 C) (taken under the tongue) or lasts longer than 24 hours.  You may have a dry mouth, a sore throat, muscle aches or trouble sleeping.  These should go away after 24 hours.  Do not make important or legal decisions.  6.   Do not drive or use heavy equipment.  If you have weakness or tingling, don't drive or use heavy equipment until this feeling goes away.    To contact a doctor, call   [phone]_______________________

## 2024-11-21 NOTE — H&P
PRE-PROCEDURE NOTE    CHIEFCOMPLAINT / REASON FOR PROCEDURE:  Screening for polyps and colorectal cancer.    PERTINENT HISTORY   Patient is due for colonoscopy. Previous colonoscopy - 2014. No family history of colon polyps or colon cancer.    Past Medical History:   Diagnosis Date    Anemia     No Comments Provided    Excessive and frequent menstruation with regular cycle     No Comments Provided    Major depressive disorder, single episode     History of depression    Personal history of other medical treatment (CODE)     age 14,for mono       Past Surgical History:   Procedure Laterality Date    COLONOSCOPY  07/24/2014 7/24/2014,10 year follow up    HYSTERECTOMY TOTAL ABDOMINAL      2004    LAPAROSCOPIC CHOLECYSTECTOMY      2/98    OTHER SURGICAL HISTORY      3/6/12,[id number],AMPUTATION,revision of left index finger, Dr Cortez         Other:  None  Bleeding tendencies: No     Relevant Family History:  None     Relevant Social History:  None     10 point ROS of systems including Constitutional, Eyes, Respiratory, Cardiovascular, Gastroenterology, Genitourinary, Integumentary, Muscularskeletal, Psychiatric were all negative except for pertinent positives noted in my HPI.      ALLERGIES/SENSITIVITIES:   Allergies   Allergen Reactions    Amoxicillin Rash        CURRENT MEDICATIONS:    Current Facility-Administered Medications   Medication Dose Route Frequency Provider Last Rate Last Admin    lactated ringers infusion   Intravenous Continuous Teodoro De La Cruz MD 30 mL/hr at 11/21/24 1058 New Bag at 11/21/24 1058    lidocaine (LMX4) cream   Topical Q1H PRN Teodoro De La Cruz MD        lidocaine 1 % 0.1-1 mL  0.1-1 mL Other Q1H PRN Teodoro De La Cruz MD        sodium chloride (PF) 0.9% PF flush 3 mL  3 mL Intracatheter Q8H Teodoro De La Cruz MD        sodium chloride (PF) 0.9% PF flush 3 mL  3 mL Intracatheter q1 min prn Teodoro De La Cruz MD               PRE-SEDATION ASSESSMENT:    LUNGS:  CTA B/L, no wheezing or  crackles.  Heart & CV:  RRR no murmur.  Intact distal pulses, good cap refill.    Comment(s):      IMPRESSION: 64 year old female in need of screening colonoscopy.    PLAN:  I discussed screening colonoscopy with the patient. Anesthesia coverage requested.    Teodoro De La Cruz MD    11/21/2024 11:58 AM

## 2024-11-25 LAB
PATH REPORT.COMMENTS IMP SPEC: NORMAL
PATH REPORT.FINAL DX SPEC: NORMAL
PATH REPORT.RELEVANT HX SPEC: NORMAL
PHOTO IMAGE: NORMAL

## 2024-12-18 ENCOUNTER — HOSPITAL ENCOUNTER (OUTPATIENT)
Dept: GENERAL RADIOLOGY | Facility: OTHER | Age: 64
Discharge: HOME | End: 2024-12-18
Attending: FAMILY MEDICINE
Payer: MEDICARE

## 2024-12-18 ENCOUNTER — OFFICE VISIT (OUTPATIENT)
Dept: FAMILY MEDICINE | Facility: OTHER | Age: 64
End: 2024-12-18
Attending: FAMILY MEDICINE
Payer: MEDICARE

## 2024-12-18 VITALS
OXYGEN SATURATION: 97 % | BODY MASS INDEX: 26.94 KG/M2 | HEART RATE: 78 BPM | HEIGHT: 62 IN | TEMPERATURE: 98.6 F | WEIGHT: 146.4 LBS | RESPIRATION RATE: 18 BRPM | DIASTOLIC BLOOD PRESSURE: 82 MMHG | SYSTOLIC BLOOD PRESSURE: 136 MMHG

## 2024-12-18 DIAGNOSIS — R10.32 PAIN IN THE GROIN, LEFT: Primary | ICD-10-CM

## 2024-12-18 DIAGNOSIS — R10.32 PAIN IN THE GROIN, LEFT: ICD-10-CM

## 2024-12-18 DIAGNOSIS — K59.00 CONSTIPATION, UNSPECIFIED CONSTIPATION TYPE: ICD-10-CM

## 2024-12-18 LAB
ALBUMIN UR-MCNC: NEGATIVE MG/DL
APPEARANCE UR: CLEAR
BILIRUB UR QL STRIP: NEGATIVE
COLOR UR AUTO: NORMAL
GLUCOSE UR STRIP-MCNC: NEGATIVE MG/DL
HGB UR QL STRIP: NEGATIVE
KETONES UR STRIP-MCNC: NEGATIVE MG/DL
LEUKOCYTE ESTERASE UR QL STRIP: NEGATIVE
NITRATE UR QL: NEGATIVE
PH UR STRIP: 6 [PH] (ref 5–9)
SP GR UR STRIP: 1.01 (ref 1–1.03)
UROBILINOGEN UR STRIP-MCNC: NORMAL MG/DL

## 2024-12-18 PROCEDURE — 81003 URINALYSIS AUTO W/O SCOPE: CPT | Mod: ZL | Performed by: FAMILY MEDICINE

## 2024-12-18 PROCEDURE — G0463 HOSPITAL OUTPT CLINIC VISIT: HCPCS

## 2024-12-18 PROCEDURE — 74019 RADEX ABDOMEN 2 VIEWS: CPT

## 2024-12-18 PROCEDURE — 73502 X-RAY EXAM HIP UNI 2-3 VIEWS: CPT

## 2024-12-18 ASSESSMENT — PAIN SCALES - PAIN ENJOYMENT GENERAL ACTIVITY SCALE (PEG)
AVG_PAIN_PASTWEEK: 5
PEG_TOTALSCORE: 7.33
INTERFERED_GENERAL_ACTIVITY: 8
INTERFERED_GENERAL_ACTIVITY: 8
INTERFERED_ENJOYMENT_LIFE: 9
INTERFERED_ENJOYMENT_LIFE: 9
PEG_TOTALSCORE: 7.33
AVG_PAIN_PASTWEEK: 5

## 2024-12-18 ASSESSMENT — PAIN SCALES - GENERAL: PAINLEVEL_OUTOF10: MODERATE PAIN (5)

## 2024-12-18 NOTE — PROGRESS NOTES
Assessment & Plan     Suspect your pain is musculoskeletal.  Reviewed x-ray that showed moderate arthritic changes in the left hip.  Previous x-ray of lumbar spine also shows moderate to severe arthritic changes.  On exam today she has tenderness over the greater trochanter.  Recommend NSAIDs and ice, IT band stretching.  Reviewed KUB that shows no obvious stone.  Urinalysis negative for blood.  Recent colonoscopy was normal.  Constipation likely contributing and she can continue on MiraLAX every 2 to 3 days.  She is comfortable with this plan and will follow-up as needed.  Pain in the groin, left    - XR Abdomen 2 Views; Future  - XR Hip Left 2-3 Views; Future  - UA Macroscopic with reflex to Microscopic and Culture; Future  - UA Macroscopic with reflex to Microscopic and Culture    Constipation, unspecified constipation type                  No follow-ups on file.    Samantha Butler is a 64 year old, presenting for the following health issues:  Pain (Left side )  64-year-old female presents with left-sided lateral hip and groin pain.    Similar episode back in Nov, seen in ER. Resolved after stretching/NSAIDs  She had a colonoscopy on November 21, tubular adenoma status post polypectomy.  Has struggled with constipation since.  Last week, had 3 days of left buttock into groin, was severe. Was constipated, suppository helped.   Denies nausea, vomiting, fever or chills.  No recent falls.  Denies numbness tingling weakness in the lower extremities.    History of Present Illness       Back Pain:  She presents for follow up of back pain. Patient's back pain is a recurring problem.  Location of back pain:  Left lower back, left buttock, left hip, left side of waist and other  Description of back pain: dull ache, sharp and stabbing  Back pain spreads: left buttocks    Since patient first noticed back pain, pain is: always present, but gets better and worse  Does back pain interfere with her job:  Not applicable  "      Reason for visit:  Pain in stomach and back  Symptom onset:  3-7 days ago  Symptoms include:  Pain  Symptom intensity:  Severe  Symptom progression:  Staying the same  Had these symptoms before:  Yes  Has tried/received treatment for these symptoms:  Yes  Previous treatment was successful:  Yes  Prior treatment description:  Stretches  What makes it worse:  No  What makes it better:  No   She is taking medications regularly.                 Review of Systems  Constitutional, HEENT, cardiovascular, pulmonary, gi and gu systems are negative, except as otherwise noted.      Objective    /82   Pulse 78   Temp 98.6  F (37  C) (Tympanic)   Resp 18   Ht 1.575 m (5' 2\")   Wt 66.4 kg (146 lb 6.4 oz)   LMP  (LMP Unknown)   SpO2 97%   Breastfeeding No   BMI 26.78 kg/m    Body mass index is 26.78 kg/m .  Physical Exam  Abdominal:      General: Abdomen is flat. Bowel sounds are normal. There is no distension.      Tenderness: There is no abdominal tenderness. There is no right CVA tenderness, left CVA tenderness or guarding.   Musculoskeletal:      Left hip: Tenderness present. Decreased range of motion.     Tenderness over the left greater trochanter.  Pain with internal rotation of the hip.  Straight leg raise negative.  Lower extremity strength and reflexes are symmetric.  Results for orders placed or performed during the hospital encounter of 12/18/24   XR Abdomen 2 Views     Status: None    Narrative    PROCEDURE: XR ABDOMEN 2 VIEWS 12/18/2024 3:11 PM    HISTORY: Pain in the groin, left    COMPARISONS: None.    TECHNIQUE: Lateral and upright    FINDINGS: Intestinal gas pattern is normal. There is no extraluminal  gas. Surgical clips are seen in the right upper quadrant.         Impression    IMPRESSION: Normal abdominal gas pattern    AJ SMITH MD         SYSTEM ID:  [id number]   Results for orders placed or performed during the hospital encounter of 12/18/24   XR Hip Left 2-3 Views     Status: " None    Narrative    PROCEDURE:  XR HIP LEFT 2-3 VIEWS    HISTORY: Pain in the groin, left    COMPARISON:  None.    TECHNIQUE:  2 views of the tip were obtained.    FINDINGS:  Mild joint space narrowing at the left hip with  degenerative osteophytes. The acetabulum and proximal femurs intact.       Impression    IMPRESSION: Mild degenerative changes in the left hip      AJ SMITH MD         SYSTEM ID:  [id number]   Results for orders placed or performed in visit on 12/18/24   UA Macroscopic with reflex to Microscopic and Culture     Status: Normal    Specimen: Urine, Clean Catch   Result Value Ref Range    Color Urine Light Yellow Colorless, Straw, Light Yellow, Yellow    Appearance Urine Clear Clear    Glucose Urine Negative Negative mg/dL    Bilirubin Urine Negative Negative    Ketones Urine Negative Negative mg/dL    Specific Gravity Urine 1.014 1.000 - 1.030    Blood Urine Negative Negative    pH Urine 6.0 5.0 - 9.0    Protein Albumin Urine Negative Negative mg/dL    Urobilinogen Urine Normal Normal, 2.0 mg/dL    Nitrite Urine Negative Negative    Leukocyte Esterase Urine Negative Negative    Narrative    Microscopic not indicated                 Signed Electronically by: Marita Hdz MD

## 2024-12-18 NOTE — NURSING NOTE
Patient is here for concerns with left side pain.  States has been several days, tried at home treatments and OTC, disrupting sleep.     No LMP recorded (lmp unknown). Patient has had a hysterectomy.  Medication Reconciliation: complete    Dorcas Tang LPN 12/18/2024 2:40 PM       Advance care directive on file? no  Advance care directive provided to patient? no       Dorcas Tang LPN

## 2025-07-01 ENCOUNTER — OFFICE VISIT (OUTPATIENT)
Dept: FAMILY MEDICINE | Facility: OTHER | Age: 65
End: 2025-07-01
Attending: NURSE PRACTITIONER
Payer: COMMERCIAL

## 2025-07-01 VITALS
DIASTOLIC BLOOD PRESSURE: 82 MMHG | HEIGHT: 62 IN | HEART RATE: 68 BPM | WEIGHT: 154.8 LBS | TEMPERATURE: 96.9 F | SYSTOLIC BLOOD PRESSURE: 112 MMHG | OXYGEN SATURATION: 97 % | RESPIRATION RATE: 16 BRPM | BODY MASS INDEX: 28.49 KG/M2

## 2025-07-01 DIAGNOSIS — F33.0 MILD EPISODE OF RECURRENT MAJOR DEPRESSIVE DISORDER: Primary | ICD-10-CM

## 2025-07-01 DIAGNOSIS — G56.03 BILATERAL CARPAL TUNNEL SYNDROME: ICD-10-CM

## 2025-07-01 PROBLEM — F33.42 RECURRENT MAJOR DEPRESSIVE DISORDER, IN FULL REMISSION: Status: RESOLVED | Noted: 2024-01-15 | Resolved: 2025-07-01

## 2025-07-01 PROCEDURE — G0463 HOSPITAL OUTPT CLINIC VISIT: HCPCS

## 2025-07-01 RX ORDER — BUPROPION HYDROCHLORIDE 150 MG/1
150 TABLET ORAL EVERY MORNING
Qty: 90 TABLET | Refills: 1 | Status: SHIPPED | OUTPATIENT
Start: 2025-07-01

## 2025-07-01 RX ORDER — MULTIPLE VITAMINS W/ MINERALS TAB 9MG-400MCG
1 TAB ORAL DAILY
COMMUNITY

## 2025-07-01 RX ORDER — SODIUM PHOSPHATE,MONO-DIBASIC 19G-7G/118
1 ENEMA (ML) RECTAL DAILY
COMMUNITY

## 2025-07-01 ASSESSMENT — ANXIETY QUESTIONNAIRES
2. NOT BEING ABLE TO STOP OR CONTROL WORRYING: SEVERAL DAYS
4. TROUBLE RELAXING: SEVERAL DAYS
8. IF YOU CHECKED OFF ANY PROBLEMS, HOW DIFFICULT HAVE THESE MADE IT FOR YOU TO DO YOUR WORK, TAKE CARE OF THINGS AT HOME, OR GET ALONG WITH OTHER PEOPLE?: SOMEWHAT DIFFICULT
GAD7 TOTAL SCORE: 8
GAD7 TOTAL SCORE: 8
7. FEELING AFRAID AS IF SOMETHING AWFUL MIGHT HAPPEN: SEVERAL DAYS
3. WORRYING TOO MUCH ABOUT DIFFERENT THINGS: SEVERAL DAYS
GAD7 TOTAL SCORE: 8
1. FEELING NERVOUS, ANXIOUS, OR ON EDGE: SEVERAL DAYS
7. FEELING AFRAID AS IF SOMETHING AWFUL MIGHT HAPPEN: SEVERAL DAYS
5. BEING SO RESTLESS THAT IT IS HARD TO SIT STILL: SEVERAL DAYS
6. BECOMING EASILY ANNOYED OR IRRITABLE: MORE THAN HALF THE DAYS
IF YOU CHECKED OFF ANY PROBLEMS ON THIS QUESTIONNAIRE, HOW DIFFICULT HAVE THESE PROBLEMS MADE IT FOR YOU TO DO YOUR WORK, TAKE CARE OF THINGS AT HOME, OR GET ALONG WITH OTHER PEOPLE: SOMEWHAT DIFFICULT

## 2025-07-01 ASSESSMENT — PATIENT HEALTH QUESTIONNAIRE - PHQ9
10. IF YOU CHECKED OFF ANY PROBLEMS, HOW DIFFICULT HAVE THESE PROBLEMS MADE IT FOR YOU TO DO YOUR WORK, TAKE CARE OF THINGS AT HOME, OR GET ALONG WITH OTHER PEOPLE: SOMEWHAT DIFFICULT
SUM OF ALL RESPONSES TO PHQ QUESTIONS 1-9: 9
SUM OF ALL RESPONSES TO PHQ QUESTIONS 1-9: 9

## 2025-07-01 ASSESSMENT — PAIN SCALES - GENERAL: PAINLEVEL_OUTOF10: NO PAIN (0)

## 2025-07-01 NOTE — PROGRESS NOTES
Assessment & Plan   Problem List Items Addressed This Visit          Behavioral Health    Depression - Primary    Relevant Medications    buPROPion (WELLBUTRIN XL) 150 MG 24 hr tablet     Other Visit Diagnoses         Bilateral carpal tunnel syndrome               Discussed medications for depression, she has tolerated fluoxetine well but did have some sexual side effects.  She would be a good candidate for Wellbutrin.  Order placed for 150 mg daily.  We discussed side effects to medication and expectations.  Plan to see her back in 6 to 8 weeks, sooner with any concerns.    Bilateral carpal tunnel syndrome, increase symptoms on right.  Discussed continued treatment for right shoulder tightness including massage, tennis ball, Thera cane's and others.  Encouraged her to brace her wrist at night when sleeping to help with carpal tunnel symptoms.  If symptoms persist or are not showing improvement, would recommend consult with orthopedics to discuss definitive treatment.    The longitudinal plan of care for the diagnosis(es)/condition(s) as documented were addressed during this visit. Due to the added complexity in care, I will continue to support Carrie in the subsequent management and with ongoing continuity of care.        Subjective   Carrie is a 65 year old, presenting for the following health issues:  Recheck Medication (Discuss a different  medication for depression and anxiety)      7/1/2025     7:51 AM   Additional Questions   Roomed by Poly Cali LPN     History of Present Illness       Mental Health Follow-up:  Patient presents to follow-up on Depression & Anxiety.Patient's depression since last visit has been:  Bad  The patient is having other symptoms associated with depression.  Patient's anxiety since last visit has been:  Bad  The patient is having other symptoms associated with anxiety.  Any significant life events: No  Patient is not feeling anxious or having panic attacks.  Patient has no concerns  "about alcohol or drug use.    Reason for visit:  Depression    She eats 0-1 servings of fruits and vegetables daily.She consumes 2 sweetened beverage(s) daily.She exercises with enough effort to increase her heart rate 9 or less minutes per day.  She exercises with enough effort to increase her heart rate 3 or less days per week.   She is taking medications regularly.        She presents to clinic today to discuss depression concerns.  She was previously on fluoxetine, was on this for many years\".  About 6 months ago she stopped the medication because she wanted to see if she really needed it.  She has slowly been noticing increased irritability.   has noted a change.  She is interested in restarting medication, asks about Wellbutrin.  Others in her family take this and has found this to be very helpful.  She is also noting with increased stress she feels she tightens up her shoulders, having some increase pain and numbness into her right arm.  Numbness to her 2nd through 4th fingers at times.  She does have known diagnosis of carpal tunnel, does not use bracing at this time      Objective    /82   Pulse 68   Temp 96.9  F (36.1  C) (Temporal)   Resp 16   Ht 1.581 m (5' 2.25\")   Wt 70.2 kg (154 lb 12.8 oz)   LMP  (LMP Unknown)   SpO2 97%   Breastfeeding No   BMI 28.09 kg/m    Body mass index is 28.09 kg/m .  Physical Exam   GENERAL: alert and no distress  EYES: Eyes grossly normal to inspection  NEURO: Normal strength and tone, mentation intact and speech normal  PSYCH: mentation appears normal, affect normal/bright            Signed Electronically by: ALVERTO Haines CNP    "

## 2025-07-01 NOTE — NURSING NOTE
"Chief Complaint   Patient presents with    Recheck Medication     Discuss a different  medication for depression and anxiety       Initial /82   Pulse 68   Temp 96.9  F (36.1  C) (Temporal)   Resp 16   Ht 1.581 m (5' 2.25\")   Wt 70.2 kg (154 lb 12.8 oz)   LMP  (LMP Unknown)   SpO2 97%   Breastfeeding No   BMI 28.09 kg/m   Estimated body mass index is 28.09 kg/m  as calculated from the following:    Height as of this encounter: 1.581 m (5' 2.25\").    Weight as of this encounter: 70.2 kg (154 lb 12.8 oz).  Medication Review: complete    The next two questions are to help us understand your food security.  If you are feeling you need any assistance in this area, we have resources available to support you today.          9/10/2024   SDOH- Food Insecurity   Within the past 12 months, did you worry that your food would run out before you got money to buy more? N   Within the past 12 months, did the food you bought just not last and you didn t have money to get more? N        Data saved with a previous flowsheet row definition         Health Care Directive:  Patient does not have a Health Care Directive: Discussed advance care planning with patient; however, patient declined at this time.    Poly Cali LPN      "

## 2025-08-12 ENCOUNTER — OFFICE VISIT (OUTPATIENT)
Dept: FAMILY MEDICINE | Facility: OTHER | Age: 65
End: 2025-08-12
Attending: NURSE PRACTITIONER
Payer: COMMERCIAL

## 2025-08-12 VITALS
RESPIRATION RATE: 16 BRPM | HEART RATE: 80 BPM | BODY MASS INDEX: 28.3 KG/M2 | HEIGHT: 62 IN | DIASTOLIC BLOOD PRESSURE: 76 MMHG | SYSTOLIC BLOOD PRESSURE: 120 MMHG | WEIGHT: 153.8 LBS | OXYGEN SATURATION: 98 %

## 2025-08-12 DIAGNOSIS — F33.0 MILD EPISODE OF RECURRENT MAJOR DEPRESSIVE DISORDER: ICD-10-CM

## 2025-08-12 DIAGNOSIS — G56.03 BILATERAL CARPAL TUNNEL SYNDROME: Primary | ICD-10-CM

## 2025-08-12 PROCEDURE — G0463 HOSPITAL OUTPT CLINIC VISIT: HCPCS

## 2025-08-12 RX ORDER — BUPROPION HYDROCHLORIDE 300 MG/1
300 TABLET ORAL EVERY MORNING
Qty: 90 TABLET | Refills: 4 | Status: SHIPPED | OUTPATIENT
Start: 2025-08-12

## 2025-08-12 ASSESSMENT — ANXIETY QUESTIONNAIRES
4. TROUBLE RELAXING: MORE THAN HALF THE DAYS
GAD7 TOTAL SCORE: 13
7. FEELING AFRAID AS IF SOMETHING AWFUL MIGHT HAPPEN: SEVERAL DAYS
2. NOT BEING ABLE TO STOP OR CONTROL WORRYING: MORE THAN HALF THE DAYS
7. FEELING AFRAID AS IF SOMETHING AWFUL MIGHT HAPPEN: SEVERAL DAYS
GAD7 TOTAL SCORE: 13
GAD7 TOTAL SCORE: 13
1. FEELING NERVOUS, ANXIOUS, OR ON EDGE: MORE THAN HALF THE DAYS
6. BECOMING EASILY ANNOYED OR IRRITABLE: MORE THAN HALF THE DAYS
8. IF YOU CHECKED OFF ANY PROBLEMS, HOW DIFFICULT HAVE THESE MADE IT FOR YOU TO DO YOUR WORK, TAKE CARE OF THINGS AT HOME, OR GET ALONG WITH OTHER PEOPLE?: SOMEWHAT DIFFICULT
3. WORRYING TOO MUCH ABOUT DIFFERENT THINGS: MORE THAN HALF THE DAYS
5. BEING SO RESTLESS THAT IT IS HARD TO SIT STILL: MORE THAN HALF THE DAYS
IF YOU CHECKED OFF ANY PROBLEMS ON THIS QUESTIONNAIRE, HOW DIFFICULT HAVE THESE PROBLEMS MADE IT FOR YOU TO DO YOUR WORK, TAKE CARE OF THINGS AT HOME, OR GET ALONG WITH OTHER PEOPLE: SOMEWHAT DIFFICULT

## 2025-08-21 ENCOUNTER — TELEPHONE (OUTPATIENT)
Dept: FAMILY MEDICINE | Facility: OTHER | Age: 65
End: 2025-08-21
Payer: COMMERCIAL

## 2025-08-21 DIAGNOSIS — L23.7 CONTACT DERMATITIS DUE TO POISON OAK: Primary | ICD-10-CM

## 2025-08-21 RX ORDER — PREDNISONE 20 MG/1
TABLET ORAL
Qty: 20 TABLET | Refills: 0 | Status: SHIPPED | OUTPATIENT
Start: 2025-08-21

## (undated) DEVICE — ENDO TRAP POLYP E-TRAP 00711099

## (undated) DEVICE — ENDO BRUSH CHANNEL MASTER CLEANING 2-4.2MM BW-412T

## (undated) DEVICE — TUBING SUCTION 10'X3/16" N510

## (undated) DEVICE — SOL WATER 1500ML

## (undated) DEVICE — SUCTION MANIFOLD NEPTUNE 2 SYS 4 PORT 0702-020-000

## (undated) DEVICE — ENDO SNARE EXACTO COLD 9MM LOOP 2.4MMX230CM 00711115

## (undated) DEVICE — ENDO KIT COMPLIANCE DYKENDOCMPLY

## (undated) RX ORDER — PROPOFOL 10 MG/ML
INJECTION, EMULSION INTRAVENOUS
Status: DISPENSED
Start: 2024-11-21